# Patient Record
Sex: MALE | Race: WHITE
[De-identification: names, ages, dates, MRNs, and addresses within clinical notes are randomized per-mention and may not be internally consistent; named-entity substitution may affect disease eponyms.]

---

## 2017-01-05 ENCOUNTER — HOSPITAL ENCOUNTER (OUTPATIENT)
Dept: HOSPITAL 45 - C.LAB | Age: 72
Discharge: HOME | End: 2017-01-05
Attending: FAMILY MEDICINE
Payer: COMMERCIAL

## 2017-01-05 DIAGNOSIS — I25.10: Primary | ICD-10-CM

## 2017-01-05 DIAGNOSIS — R97.20: ICD-10-CM

## 2017-01-05 DIAGNOSIS — R73.03: ICD-10-CM

## 2017-01-05 DIAGNOSIS — E78.5: ICD-10-CM

## 2017-01-05 LAB
ALBUMIN/GLOB SERPL: 0.9 {RATIO} (ref 0.9–2)
ALP SERPL-CCNC: 99 U/L (ref 45–117)
ALT SERPL-CCNC: 26 U/L (ref 12–78)
ANION GAP SERPL CALC-SCNC: 8 MMOL/L (ref 3–11)
AST SERPL-CCNC: 23 U/L (ref 15–37)
BASOPHILS # BLD: 0.07 K/UL (ref 0–0.2)
BASOPHILS NFR BLD: 1.3 %
BUN SERPL-MCNC: 16 MG/DL (ref 7–18)
BUN/CREAT SERPL: 16.8 (ref 10–20)
CALCIUM SERPL-MCNC: 8.4 MG/DL (ref 8.5–10.1)
CHLORIDE SERPL-SCNC: 106 MMOL/L (ref 98–107)
CO2 SERPL-SCNC: 29 MMOL/L (ref 21–32)
COMPLETE: YES
CREAT SERPL-MCNC: 0.96 MG/DL (ref 0.6–1.4)
EOSINOPHIL NFR BLD AUTO: 191 K/UL (ref 130–400)
EST. AVERAGE GLUCOSE BLD GHB EST-MCNC: 128 MG/DL
GLOBULIN SER-MCNC: 3.4 GM/DL (ref 2.5–4)
GLUCOSE SERPL-MCNC: 116 MG/DL (ref 70–99)
HCT VFR BLD CALC: 43.2 % (ref 42–52)
IG%: 0.4 %
IMM GRANULOCYTES NFR BLD AUTO: 21.8 %
INR PPP: 1 (ref 0.9–1.1)
LYMPHOCYTES # BLD: 1.13 K/UL (ref 1.2–3.4)
MCH RBC QN AUTO: 30 PG (ref 25–34)
MCHC RBC AUTO-ENTMCNC: 34.3 G/DL (ref 32–36)
MCV RBC AUTO: 87.6 FL (ref 80–100)
MONOCYTES NFR BLD: 11.6 %
NEUTROPHILS # BLD AUTO: 6 %
NEUTROPHILS NFR BLD AUTO: 58.9 %
PARTIAL THROMBOPLASTIN RATIO: 1.2
PMV BLD AUTO: 9.3 FL (ref 7.4–10.4)
POTASSIUM SERPL-SCNC: 4 MMOL/L (ref 3.5–5.1)
PROTHROMBIN TIME: 10.6 SECONDS (ref 9–12)
PSA SERPL-MCNC: 6.95 NG/ML (ref 0–4)
RBC # BLD AUTO: 4.93 M/UL (ref 4.7–6.1)
SODIUM SERPL-SCNC: 143 MMOL/L (ref 136–145)
WBC # BLD AUTO: 5.19 K/UL (ref 4.8–10.8)

## 2017-01-05 NOTE — PAT MEDICATION INSTRUCTIONS
Service Date


Jan 5, 2017.





Current Home Medication List


Amlodipine (Norvasc), 5 MG PO QAM


Aspirin (Aspirin Ec), 81 MG PO QAM


Atenolol (Tenormin), 25 MG PO QAM


Doxycycline Hyclate (Doxycycline Hyclate), 1 TAB PO BID


Fluticasone Propionate (Flovent Hfa), 2 PUFFS INH BID PRN for PRN


Lorazepam (Ativan), 0.5 MG PO TID PRN for Anxiety


Omeprazole (Prilosec), 20 MG PO QAM





Medication Instructions


For Your Scheduled Surgery 





Doxycycline Hyclate (Doxycycline Hyclate), 1 TAB PO BID (to be completed prior 

to surgery)





- Take the following medications the morning of surgery with a sip of water:


Omeprazole (Prilosec), 20 MG PO QAM


Lorazepam (Ativan), 0.5 MG PO TID PRN for Anxiety


Fluticasone Propionate (Flovent Hfa), 2 PUFFS INH BID PRN for PRN


Amlodipine (Norvasc), 5 MG PO QAM


Aspirin (Aspirin Ec), 81 MG PO QAM (okay to take per surgeon instructions)


Atenolol (Tenormin), 25 MG PO QAM





- Take the following medications as scheduled the night before surgery:


Lorazepam (Ativan), 0.5 MG PO TID PRN for Anxiety


Fluticasone Propionate (Flovent Hfa), 2 PUFFS INH BID PRN for PRN





If you have any questions please call us at [phone] (Shirley Delacruz PA-C)

 or [phone] or [phone]

## 2017-01-05 NOTE — DIAGNOSTIC IMAGING REPORT
CHEST PREADMISSION(PA/LAT)



CLINICAL HISTORY: PAT preoperative evaluation



COMPARISON STUDY:  No previous studies for comparison.



FINDINGS: Prior median sternotomy. Heart is enlarged. Diaphragms smooth. Lungs

are clear. 



IMPRESSION:  Cardiomegaly post median sternotomy. No acute process. 









Electronically signed by:  Lanre Randle M.D.

1/5/2017 12:48 PM

## 2017-01-09 NOTE — CODING QUERY MEDICAL NECESSITY
SUPPORTING DIAGNOSIS NEEDED





A supporting diagnosis is required for the test/procedure performed on this patient in 
order for us to be reimbursed by the patient's insurance. Please provide a supporting 
diagnosis for the following test/procedure listed below next to the test name along with 
your signature. 



*If there is no additional diagnosis for this patient that would support the following 
test/procedure please document that below next to the test/procedure.



Test(s)/Procedure(s) that require a supporting diagnosis:

DOS 1/5

* Hba1c      DIAGNOSIS:



Provider Signature:  ______________________________  Date:  _______



Thank you  

Lesia Mohr

Health Information Management

Phone:  [phone]

Fax:  [phone]



Once completed, please kindly fax back to [phone]



For questions please call [phone]

## 2017-01-22 NOTE — HISTORY & PHYSICAL EXAMINATION
DATE OF ADMISSION:  01/27/2017

 

PREOPERATIVE HISTORY AND PHYSICAL EXAMINATION

 

SUBJECTIVE CHIEF COMPLAINT:  Left knee pain.

 

HISTORY OF PRESENT ILLNESS:  The patient is a 71-year-old male who complains

of left knee pain.  His knee pain and stiffness has been occurring for

several years that has been progressively getting worse.  He describes the

pain as aching and sharp.  He has tried cortisone and visco injections in the

past with little relief.  He has tried NSAIDs as well as physical therapy

with no relief.  He wishes to proceed with a total left knee arthroplasty.

 

PAST MEDICAL HISTORY:  Hypertension, hypercholesterolemia, osteoarthritis,

BPH, coronary artery disease.

 

PAST SURGICAL HISTORY:  Cardiac bypass surgery in 1996, two stents in 2015,

fundoplication x2, cholecystectomy, inguinal hernia surgery.

 

SOCIAL HISTORY:  He denies alcohol use.  He denies smoking or tobacco product

use.  He denies IV drug use.  He lives in a 1-story house.  He is currently

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

 

FAMILY HISTORY:  Father has a history of heart attacks.

 

MEDICATIONS:  Aspirin 81 mg 1 tab daily, atenolol 25 mg 1 tab daily, Prilosec

20 mg 1 capsule daily, Ambien 5 mg as needed for sleep, lorazepam 0.5 mg 3

times a day as needed, Welchol 625 mg, amlodipine 5 mg 1 tab daily, Flovent

110 mcg.

 

ALLERGIES:  ALL STATINS, Lisinopril, Pravachol ZETIA, VASOTEC.

 

REVIEW OF SYSTEMS:  He denies fevers, chills, headaches, weight loss, double

vision, blurry vision, sore throat, hearing loss, tremors, dizziness,

numbness or tingling, tired, thirsty, hot and cold intolerance, abdominal

pain, nausea, vomiting, diarrhea, heartburn, chest pain following into the

legs or feet, frequency going to the bathroom, pain or burning with

urination, incontinence, wheezing, cough, shortness of breath, depression,

thoughts to harm herself or harm others, nervousness or anxiousness.  He is

positive for joint pain, stiffness and swelling of the left knee.

 

OBJECTIVE PHYSICAL EXAMINATION:

GENERAL APPEARANCE:  The patient is a 71-year-old male sitting in no acute

distress, well dressed, well nourished.  He is awake, alert and oriented x3.

VITAL SIGNS:  He is 5 feet 10 inches, 258 pounds, blood pressure 128/76.

HEENT:  Extraocular movements intact, PERRLA, mucosa is moist.  No septal

deviation.

NECK:  Supple, no lymphadenopathy, no JVD, no thyromegaly.

HEART:  Irregular rate and rhythm but no murmurs or gallops.

LUNGS:  Clear to auscultation with no wheezing or rhonchi.

ABDOMEN:  Soft, nontender, nondistended.  Normal bowel sounds, no

hepatosplenomegaly.

EXTREMITIES:  Paying particular attention to his left knee, he has pain along

the medial joint line.  He has his range of motion active flexion to 0-90

degrees, active extension to 0 degrees.  He has a mildly positive valgus

stress test due to ligament laxity.

NEUROLOGIC:  Cranial nerves II-XII are intact.  Pulses were compared 
bilaterally and were symmetrical .

 

IMAGING:  X-rays of the left knee shows osteophyte formation, medial joint

space narrowing, subchondral sclerosis.

 

IMPRESSION:  Severe end-stage osteoarthritis of the left knee.

 

PLAN:  The patient is scheduled for a left knee arthroplasty.  He has failed

conservative measures such as NSAIDs, physical therapy, cortisone injections

and visco supplementation.  He wishes to proceed with the procedure.  Risks

and benefits were discussed that were included but not limited to blood

loss, nerve damage, blood vessel damage, DVT, increased pain, failure to

relieve pain, revision surgeries, and anesthesia risks.  He understands these

risks and wishes to proceed.  All questions were answered to his

satisfaction.

 

 

 

LAURENCE

## 2017-01-27 ENCOUNTER — HOSPITAL ENCOUNTER (INPATIENT)
Dept: HOSPITAL 45 - C.ACU | Age: 72
LOS: 2 days | Discharge: HOME | DRG: 470 | End: 2017-01-29
Attending: ORTHOPAEDIC SURGERY | Admitting: ORTHOPAEDIC SURGERY
Payer: COMMERCIAL

## 2017-01-27 VITALS
HEART RATE: 54 BPM | OXYGEN SATURATION: 95 % | SYSTOLIC BLOOD PRESSURE: 173 MMHG | TEMPERATURE: 98.42 F | DIASTOLIC BLOOD PRESSURE: 91 MMHG

## 2017-01-27 VITALS
HEART RATE: 73 BPM | TEMPERATURE: 97.52 F | SYSTOLIC BLOOD PRESSURE: 132 MMHG | OXYGEN SATURATION: 95 % | DIASTOLIC BLOOD PRESSURE: 78 MMHG

## 2017-01-27 VITALS
SYSTOLIC BLOOD PRESSURE: 143 MMHG | TEMPERATURE: 97.7 F | DIASTOLIC BLOOD PRESSURE: 56 MMHG | OXYGEN SATURATION: 95 % | HEART RATE: 59 BPM

## 2017-01-27 VITALS
OXYGEN SATURATION: 92 % | HEART RATE: 58 BPM | SYSTOLIC BLOOD PRESSURE: 139 MMHG | TEMPERATURE: 97.88 F | DIASTOLIC BLOOD PRESSURE: 79 MMHG

## 2017-01-27 VITALS
WEIGHT: 260.81 LBS | BODY MASS INDEX: 35.33 KG/M2 | BODY MASS INDEX: 35.33 KG/M2 | WEIGHT: 260.81 LBS | HEIGHT: 72 IN | HEIGHT: 72 IN | BODY MASS INDEX: 35.33 KG/M2

## 2017-01-27 VITALS
DIASTOLIC BLOOD PRESSURE: 70 MMHG | HEART RATE: 60 BPM | SYSTOLIC BLOOD PRESSURE: 153 MMHG | OXYGEN SATURATION: 94 % | TEMPERATURE: 97.52 F

## 2017-01-27 VITALS — SYSTOLIC BLOOD PRESSURE: 132 MMHG | HEART RATE: 57 BPM | OXYGEN SATURATION: 96 % | DIASTOLIC BLOOD PRESSURE: 73 MMHG

## 2017-01-27 VITALS
DIASTOLIC BLOOD PRESSURE: 79 MMHG | TEMPERATURE: 97.88 F | HEART RATE: 75 BPM | SYSTOLIC BLOOD PRESSURE: 144 MMHG | OXYGEN SATURATION: 94 %

## 2017-01-27 VITALS — DIASTOLIC BLOOD PRESSURE: 72 MMHG | OXYGEN SATURATION: 96 % | SYSTOLIC BLOOD PRESSURE: 129 MMHG | HEART RATE: 67 BPM

## 2017-01-27 DIAGNOSIS — I10: ICD-10-CM

## 2017-01-27 DIAGNOSIS — R00.1: ICD-10-CM

## 2017-01-27 DIAGNOSIS — D64.9: ICD-10-CM

## 2017-01-27 DIAGNOSIS — F41.9: ICD-10-CM

## 2017-01-27 DIAGNOSIS — E78.00: ICD-10-CM

## 2017-01-27 DIAGNOSIS — Z95.5: ICD-10-CM

## 2017-01-27 DIAGNOSIS — K21.9: ICD-10-CM

## 2017-01-27 DIAGNOSIS — M23.42: ICD-10-CM

## 2017-01-27 DIAGNOSIS — Z95.1: ICD-10-CM

## 2017-01-27 DIAGNOSIS — Z79.82: ICD-10-CM

## 2017-01-27 DIAGNOSIS — N40.0: ICD-10-CM

## 2017-01-27 DIAGNOSIS — Z79.51: ICD-10-CM

## 2017-01-27 DIAGNOSIS — Z79.899: ICD-10-CM

## 2017-01-27 DIAGNOSIS — M17.0: Primary | ICD-10-CM

## 2017-01-27 DIAGNOSIS — E66.9: ICD-10-CM

## 2017-01-27 DIAGNOSIS — I25.10: ICD-10-CM

## 2017-01-27 PROCEDURE — 0SRD0J9 REPLACEMENT OF LEFT KNEE JOINT WITH SYNTHETIC SUBSTITUTE, CEMENTED, OPEN APPROACH: ICD-10-PCS | Performed by: ORTHOPAEDIC SURGERY

## 2017-01-27 RX ADMIN — ACETAMINOPHEN SCH MG: 500 TABLET, COATED ORAL at 21:58

## 2017-01-27 RX ADMIN — CEFAZOLIN SCH MLS/HR: 10 INJECTION, POWDER, FOR SOLUTION INTRAVENOUS at 13:14

## 2017-01-27 RX ADMIN — OXYCODONE HYDROCHLORIDE SCH MG: 10 TABLET, FILM COATED, EXTENDED RELEASE ORAL at 21:57

## 2017-01-27 RX ADMIN — Medication SCH MG: at 21:57

## 2017-01-27 RX ADMIN — DOCUSATE SODIUM SCH MG: 100 CAPSULE, LIQUID FILLED ORAL at 21:56

## 2017-01-27 RX ADMIN — TRANEXAMIC ACID SCH MLS/HR: 100 INJECTION, SOLUTION INTRAVENOUS at 08:10

## 2017-01-27 RX ADMIN — DEXTROSE MONOHYDRATE, SODIUM CHLORIDE, AND POTASSIUM CHLORIDE SCH MLS/HR: 50; 4.5; 1.49 INJECTION, SOLUTION INTRAVENOUS at 13:18

## 2017-01-27 RX ADMIN — ACETAMINOPHEN SCH MG: 500 TABLET, COATED ORAL at 13:16

## 2017-01-27 RX ADMIN — TRANEXAMIC ACID SCH MLS/HR: 100 INJECTION, SOLUTION INTRAVENOUS at 08:00

## 2017-01-27 RX ADMIN — KETOROLAC TROMETHAMINE SCH MG: 15 INJECTION INTRAMUSCULAR; INTRAVENOUS at 18:13

## 2017-01-27 RX ADMIN — CEFAZOLIN SCH MLS/HR: 10 INJECTION, POWDER, FOR SOLUTION INTRAVENOUS at 21:57

## 2017-01-27 RX ADMIN — KETOROLAC TROMETHAMINE SCH MG: 15 INJECTION INTRAMUSCULAR; INTRAVENOUS at 13:15

## 2017-01-27 RX ADMIN — FERROUS GLUCONATE SCH MG: 324 TABLET ORAL at 18:13

## 2017-01-27 RX ADMIN — FERROUS GLUCONATE SCH MG: 324 TABLET ORAL at 13:15

## 2017-01-27 NOTE — OPERATIVE REPORT
DATE OF OPERATION:  01/27/2017

 

PREOPERATIVE DIAGNOSIS:  Left knee degenerative joint disease.

 

POSTOPERATIVE DIAGNOSIS:  Same.

 

PROCEDURE:  Left total knee arthroplasty.

 

SURGEON:  Dr. Lal. 

 

ASSISTANT:  Rito Weeks PA-C and Trevon Caban PA-C who was necessary

for assistance of procedure with positioning, prepping, draping, retraction

and closure.

 

ANESTHESIA:  Spinal with adductor canal block.

 

SPECIMEN:  Bone and tissue.

 

DRAINS:  One medium Hemovac.

 

IMPLANTS:  More \T\ Nephew Journey version 2, femur 7, tibia 6, poly 9,

patella 32 oval.

 

COMPLICATIONS:  None.

 

ESTIMATED BLOOD LOSS:  20 mL.

 

INDICATIONS:  The patient is a 71-year-old male with longstanding

degenerative joint disease of bilateral knees.  He has failed conservative

measures including cortisone injection, physical therapy and

anti-inflammatory medications.  Failing conservative measures, he wished to

proceed with left total knee arthroplasty.  Risks, benefits, and alternatives

of surgery including but not limited to infection, DVT, pain, sepsis, need

for urgent surgery, failure to relieve all symptoms, damage to blood vessels,

damage to nerves, risks of anesthesia discussed with the patient and he

wished to proceed.

 

OPERATION AND FINDINGS: 

 

DESCRIPTION OF PROCEDURE:  The patient was identified, laterality was

confirmed and marked.  He received a preoperative antibiotic as well as a

spinal and adductor canal block.  Left lower extremity had a well-padded

tourniquet applied and the limb was prepped and draped in usual sterile

manner with ChloraPrep.  Limb was exsanguinated and tourniquet was inflated. 

I made a standard anterior incision sharply incising the skin utilizing Bovie

electrocautery to achieve hemostasis.  I made a medial parapatellar

arthrotomy, mobilized the patella laterally.  I excised the anterior horns of

the medial and lateral menisci back.  I removed the fat pad and then pinned

in place a distal femoral cutting guide, which was a patient matched guide,

made my distal femoral resection and then pinned in place a size 5-in-1

cutting guide, made my anterior, posterior chamfer cuts.  I then removed the

remaining portions of the meniscus as well as the cruciates.  I then pinned

into place my tibial cutting guide, made my tibial resection and then sized

and positioned for a size 6 tibia cut for the post and pinned this into

place.  I then removed the posterior osteophytes off the femur.  He was

relatively tight, so we elevated some of the posterior capsule off the

backside of the femur. He had a fair number of loose bodies posteriorly that

were removed.  I then placed the trial femur into place, cut for the

trochlear component and then trialed with a 9 poly, had good range of motion,

good stability with a 9 poly.  I then prepared the patella with a freehand

cut and then sized and drilled for a size 32 patella.  We had good tracking

to the patella.  No lateral release was required.  All the trial components

were removed.  Wound was thoroughly irrigated.  The deep tissues were

anesthetized with an Orthomix solution and then with Simplex HV with gent

cement. We cemented the definitive components.  This was More \T\ Nephew

Journey version 2, femur size 7, tibia size 6, poly 9, patella 32 oval. 

Betadine soak was performed.  The drain was then placed.  The arthrotomy was

closed with interrupted #1 Vicryl sutures, subcutaneous tissue with

interrupted 2-0 Vicryl suture and skin with staples.  Sterile dressing was

applied and tourniquet was released.  All needle and sponge counts were

correct at the end of the procedure.  The patient was transferred to the PACU

in stable condition without apparent complication.

 

 

I attest to the content of the Intraoperative Record and any orders documented therein. Any exceptio
ns are noted below.

## 2017-01-27 NOTE — DIAGNOSTIC IMAGING REPORT
TWO VIEWS LEFT KNEE



CLINICAL HISTORY:  Postoperative examination.



FINDINGS: AP and crosstable lateral portable views of the left knee are

obtained. A left knee arthroplasty is in near anatomic alignment. There has been

undersurface remodeling of the patella. No acute fracture is seen. There are

expected postoperative changes around the knee including skin clips, a surgical

drain, soft tissue edema, and subcutaneous gas.



IMPRESSION: Expected postoperative changes status post left knee arthroplasty.

No acute fracture is seen.







Electronically signed by:  Harsh Vasques M.D.

1/27/2017 10:33 AM



Dictated Date/Time:  1/27/2017 10:33 AM

## 2017-01-27 NOTE — [facility] POST OPERATIVE BRIEF NOTE
Immediate Operative Summary


Operative Date


Jan 27, 2017.





Pre-Operative Diagnosis





Severe End-Stage Osteoarthritis of Left Knee





Post-Operative Diagnosis





Same as preoperative





Procedure(s) Performed





Left Total Knee Arthroplasty, Cemented





Surgeon


Dr. Liam Lal





Assistant Surgeon(s)


Trevon Caban PA-C, Rito Weeks PA-C





Estimated Blood Loss


20ml





Findings


above





Specimens





A.) Left Knee Bone and Tissue Fragments





Drains


2 hemovac





Anesthesia


spinal





Complication(s)


None





Disposition


Recovery Room / PACU

## 2017-01-27 NOTE — ANESTHESIOLOGY PROGRESS NOTE
Anesthesia Post Op Note


Date & Time


Jan 27, 2017 at 09:49





Vital Signs


Pain Intensity:  0





 Vital Signs Past 12 Hours








  Date Time  Temp Pulse Resp B/P Pulse Ox O2 Delivery O2 Flow Rate FiO2


 


1/27/17 09:40  52 16 114/69 98 Nasal Cannula 2 


 


1/27/17 09:30 36.2 59 16 129/68 96 Nasal Cannula 2 


 


1/27/17 06:19 36.9 54 20 173/91 95 Room Air  











Notes


Mental Status:  alert / awake / arousable, participated in evaluation


Pt Amnestic to Procedure:  Yes


Nausea / Vomiting:  adequately controlled


Pain:  adequately controlled


Airway Patency, RR, SpO2:  stable & adequate


BP & HR:  stable & adequate


Hydration State:  stable & adequate


Neuraxial Anesthesia:  was administered, sensory block is resolving


Anesthetic Complications:  no major complications apparent

## 2017-01-28 VITALS
TEMPERATURE: 97.88 F | DIASTOLIC BLOOD PRESSURE: 74 MMHG | SYSTOLIC BLOOD PRESSURE: 128 MMHG | HEART RATE: 60 BPM | OXYGEN SATURATION: 96 %

## 2017-01-28 VITALS
HEART RATE: 65 BPM | TEMPERATURE: 98.06 F | OXYGEN SATURATION: 96 % | SYSTOLIC BLOOD PRESSURE: 122 MMHG | DIASTOLIC BLOOD PRESSURE: 58 MMHG

## 2017-01-28 VITALS
DIASTOLIC BLOOD PRESSURE: 60 MMHG | TEMPERATURE: 97.52 F | SYSTOLIC BLOOD PRESSURE: 135 MMHG | OXYGEN SATURATION: 93 % | HEART RATE: 69 BPM

## 2017-01-28 VITALS
TEMPERATURE: 97.7 F | SYSTOLIC BLOOD PRESSURE: 147 MMHG | OXYGEN SATURATION: 93 % | HEART RATE: 59 BPM | DIASTOLIC BLOOD PRESSURE: 75 MMHG

## 2017-01-28 VITALS
SYSTOLIC BLOOD PRESSURE: 142 MMHG | OXYGEN SATURATION: 93 % | TEMPERATURE: 97.7 F | DIASTOLIC BLOOD PRESSURE: 80 MMHG | HEART RATE: 68 BPM

## 2017-01-28 VITALS — OXYGEN SATURATION: 96 %

## 2017-01-28 LAB
ANION GAP SERPL CALC-SCNC: 7 MMOL/L (ref 3–11)
BUN SERPL-MCNC: 19 MG/DL (ref 7–18)
BUN/CREAT SERPL: 19.1 (ref 10–20)
CALCIUM SERPL-MCNC: 8 MG/DL (ref 8.5–10.1)
CHLORIDE SERPL-SCNC: 105 MMOL/L (ref 98–107)
CO2 SERPL-SCNC: 27 MMOL/L (ref 21–32)
CREAT CL PREDICTED SERPL C-G-VRATE: 90.9 ML/MIN
CREAT SERPL-MCNC: 0.99 MG/DL (ref 0.6–1.4)
EOSINOPHIL NFR BLD AUTO: 185 K/UL (ref 130–400)
GLUCOSE SERPL-MCNC: 211 MG/DL (ref 70–99)
HCT VFR BLD CALC: 36.6 % (ref 42–52)
MCH RBC QN AUTO: 30 PG (ref 25–34)
MCHC RBC AUTO-ENTMCNC: 34.7 G/DL (ref 32–36)
MCV RBC AUTO: 86.5 FL (ref 80–100)
PMV BLD AUTO: 9.8 FL (ref 7.4–10.4)
POTASSIUM SERPL-SCNC: 3.8 MMOL/L (ref 3.5–5.1)
RBC # BLD AUTO: 4.23 M/UL (ref 4.7–6.1)
SODIUM SERPL-SCNC: 139 MMOL/L (ref 136–145)
WBC # BLD AUTO: 14.79 K/UL (ref 4.8–10.8)

## 2017-01-28 RX ADMIN — ACETAMINOPHEN SCH MG: 500 TABLET, COATED ORAL at 05:20

## 2017-01-28 RX ADMIN — FERROUS GLUCONATE SCH MG: 324 TABLET ORAL at 13:43

## 2017-01-28 RX ADMIN — ACETAMINOPHEN SCH MG: 500 TABLET, COATED ORAL at 13:43

## 2017-01-28 RX ADMIN — AMLODIPINE BESYLATE SCH MG: 5 TABLET ORAL at 09:07

## 2017-01-28 RX ADMIN — Medication SCH MG: at 09:07

## 2017-01-28 RX ADMIN — PANTOPRAZOLE SCH MG: 40 TABLET, DELAYED RELEASE ORAL at 09:09

## 2017-01-28 RX ADMIN — KETOROLAC TROMETHAMINE SCH MG: 15 INJECTION INTRAMUSCULAR; INTRAVENOUS at 05:20

## 2017-01-28 RX ADMIN — ACETAMINOPHEN SCH MG: 500 TABLET, COATED ORAL at 21:08

## 2017-01-28 RX ADMIN — FERROUS GLUCONATE SCH MG: 324 TABLET ORAL at 18:17

## 2017-01-28 RX ADMIN — DOCUSATE SODIUM SCH MG: 100 CAPSULE, LIQUID FILLED ORAL at 09:11

## 2017-01-28 RX ADMIN — CELECOXIB SCH MG: 200 CAPSULE ORAL at 21:08

## 2017-01-28 RX ADMIN — DOCUSATE SODIUM SCH MG: 100 CAPSULE, LIQUID FILLED ORAL at 21:09

## 2017-01-28 RX ADMIN — Medication SCH TAB: at 09:08

## 2017-01-28 RX ADMIN — DEXTROSE MONOHYDRATE, SODIUM CHLORIDE, AND POTASSIUM CHLORIDE SCH MLS/HR: 50; 4.5; 1.49 INJECTION, SOLUTION INTRAVENOUS at 00:03

## 2017-01-28 RX ADMIN — OXYCODONE HYDROCHLORIDE SCH MG: 10 TABLET, FILM COATED, EXTENDED RELEASE ORAL at 21:07

## 2017-01-28 RX ADMIN — DEXTROSE MONOHYDRATE, SODIUM CHLORIDE, AND POTASSIUM CHLORIDE SCH MLS/HR: 50; 4.5; 1.49 INJECTION, SOLUTION INTRAVENOUS at 13:41

## 2017-01-28 RX ADMIN — OXYCODONE HYDROCHLORIDE SCH MG: 10 TABLET, FILM COATED, EXTENDED RELEASE ORAL at 09:10

## 2017-01-28 RX ADMIN — KETOROLAC TROMETHAMINE SCH MG: 15 INJECTION INTRAMUSCULAR; INTRAVENOUS at 00:04

## 2017-01-28 RX ADMIN — FERROUS GLUCONATE SCH MG: 324 TABLET ORAL at 09:11

## 2017-01-28 RX ADMIN — Medication SCH MG: at 21:09

## 2017-01-28 NOTE — DISCHARGE INSTRUCTIONS
Discharge Instructions


Admission


Reason for Admission:  Left Knee Osteoarthrtis





Discharge


Discharge Diagnosis / Problem:  Left Total Knee Arthroplasty





Discharge Goals


Goal(s):  Decrease discomfort, Improve function, Increase independence, 

Therapeutic intervention





Activity Recommendations


Activity Limitations:  per Instructions/Follow-up section


ACTIVITY RECOMMENDATIONS:





SELF CARE INSTRUCTIONS AFTER TOTAL KNEE REPLACEMENT





A.  You may need to continue a physical therapy program after discharge from 

the hospital.  There are several options available to you. 


      Your doctor will assist you in selecting the best one for you.





   1.  An out-patient facility 2 to 3 times a week for therapy or home therapy.


   2.  Continue working on all exercises taught to you in the hospital.  Your


                 goals should be to increase bending of your knee to 90 degrees 

and


                 beyond and to fully straighten your knee.





B.  You may progress at your own pace from walking with a walker or crutches to 

a cane; then to no assistive devices.





C.   Make walking a part of your daily routine.  Be up as much as comfortable 

with rest periods throughout the day.  


      Rest with leg elevation is very important. 


      Use the ice wrap frequently for the first 3-4 weeks.





D.  There are no restrictions on activities.  You may ride in a car, shop, 

participate in household chores and all social activities.





E.  Wear the long elastic stockings (SUBHA hose) 20 hours a day for 2 weeks after 

surgery.  


     They can be removed several times a day for laundering and for a bath.





F.  You may shower, no tub baths until cleared by your doctor.








SPECIAL CARE INSTRUCTIONS:





**VERY IMPORTANT TO READ AND REVIEW**





A.  There are a few signs you need to watch for after you are home.  Call  

[facility] if you notice any of the followin.  Increased severe knee pain.  Some pain is expected especially  when you 

exercise.


   2.  Increased swelling in your leg or knee; pain or swelling of the calf 

muscle in either lower leg.


   3.  Any fluid drainage from the incision.


   4.  Shortness of breath or chest pain.





B.  Please call [facility] at [phone] if you have any  

concerns or questions about your operation or recovery.  


     The doctor or his nurse will return your call promptly.





C.  You must take antibiotics before dental work, bladder, bowel or other 

surgery.  


      Your doctor will provide you with a permanent care to carry describing 

this precaution.





IMPORTANT:





*  REMEMBER TO TAKE ASPIRIN, 81 MG, TWICE DAILY FOR 4 WEEKS UNLESS OTHERWISE 

DIRECTED.  


   THIS IS YOUR BLOOD THINNER.





*  HIGH RISK PATIENTS MAY BE PRESCRIBED A STRONGER BLOOD THINNER.  


   THIS WILL BE PROVIDED AT DISCHARGE.





*  CALL IF INCREASED PAIN, REDNESS, DRAINAGE OR FEVER GREATER THAT 101.





*  WEAR SUBHA HOSE 20 HOURS PER DAY FOR 2 WEEKS.





*  YOU MAY HAVE A LARGE BAND-AID LIKE DRESSING (SILVERON).  THIS WILL  REMAIN 

ON YOUR INCISION FOR 7 DAYS, THEN CAN BE REMOVED. 


    IF INCISION IS LEAKING THROUGH DRESSING, CALL THE OFFICE [phone].








FOLLOW UP VISIT:





If appointment is not already scheduled:





Please call [facility] to make a follow-up appointment for 


2 weeks after your surgery at [phone].








.





Current Hospital Diet


Patient's current hospital diet: Regular Diet





Discharge Diet


Recommended Diet:  Regular Diet





Procedures


Procedures Performed:  


Left Total Knee Arthroplasty, Cemented





Pending Studies


Studies pending at discharge:  no





Laboratory Results





 Hemoglobin A1c








Test


  17


12:26 Range/Units


 


 


Estimated Average Glucose 128   mg/dl


 


Hemoglobin A1c 6.1 H 4.5-5.6  %











Medical Emergencies








.


Who to Call and When:





Medical Emergencies:  If at any time you feel your situation is an emergency, 

please call 911 immediately.





.





Non-Emergent Contact


Non-Emergency issues call your:  Primary Care Provider


.





"Provider Documentation" section prepared by Skye Chowdhury.





VTE Core Measure


Inpt VTE Proph given/why not?:  Other Anticoagulation, T.E.D. Stockings, SCD's

## 2017-01-28 NOTE — ORTHOPEDIC PROGRESS NOTE
Orthopedic Progress Note


Date of Service


Jan 28, 2017.





Subjective


Post OP Day:  1


Reports: feeling well, pain controlled w PO medications, Denies: SOB, chest pain

, light headedness, nausea / vomiting





Objective


calves soft nontender, N/V intact, capillary refill less than 2 sec., dressing C

/D/I, A&O x3, toes mobile, hemovac drainage











  Date Time  Temp Pulse Resp B/P Pulse Ox O2 Delivery O2 Flow Rate FiO2


 


1/28/17 11:59 36.6 60 16 128/74 96 Room Air  


 


1/28/17 09:31     96 Room Air  


 


1/28/17 08:18 36.7 65 16 122/58 96 Room Air  


 


1/28/17 07:45      Room Air  


 


1/28/17 03:45 36.4 69 18 135/60 93 Room Air  


 


1/28/17 00:01      Room Air  


 


1/27/17 23:12 36.6 75 18 144/79 94 Room Air  


 


1/27/17 19:39 36.4 73 18 132/78 95 Room Air  


 


1/27/17 16:00      Room Air  


 


1/27/17 15:02 36.4 60 16 153/70 94 Room Air  


 


1/27/17 13:34 36.5 59 16 143/56 95 Room Air  








Laboratory Results 24 Hours:











Test


  1/28/17


05:35


 


Hematocrit 36.6 % 


 


Hemoglobin 12.7 g/dL 











Assessment & Plan


Assessment:


POD#1 s/p Left TKA








Inhouse Planning


Pain Management:  Celebrex, Oxycontin, PO Tylenol, Oxy IR


DVT Prophylaxis:  TEDs, SCDs, ASA





Discharge Planning


Discharge Planning:  home with oppt (plan for )


Therapy:  Physical Therapy, Occupational Therapy (Plan for discharge tomorrow 

with OPPT)

## 2017-01-29 VITALS
DIASTOLIC BLOOD PRESSURE: 78 MMHG | HEART RATE: 66 BPM | SYSTOLIC BLOOD PRESSURE: 144 MMHG | OXYGEN SATURATION: 96 % | TEMPERATURE: 97.88 F

## 2017-01-29 VITALS
HEART RATE: 66 BPM | DIASTOLIC BLOOD PRESSURE: 78 MMHG | TEMPERATURE: 97.88 F | OXYGEN SATURATION: 96 % | SYSTOLIC BLOOD PRESSURE: 144 MMHG

## 2017-01-29 RX ADMIN — DOCUSATE SODIUM SCH MG: 100 CAPSULE, LIQUID FILLED ORAL at 08:44

## 2017-01-29 RX ADMIN — Medication SCH MG: at 08:42

## 2017-01-29 RX ADMIN — ACETAMINOPHEN SCH MG: 500 TABLET, COATED ORAL at 05:24

## 2017-01-29 RX ADMIN — OXYCODONE HYDROCHLORIDE SCH MG: 10 TABLET, FILM COATED, EXTENDED RELEASE ORAL at 08:43

## 2017-01-29 RX ADMIN — Medication SCH TAB: at 08:42

## 2017-01-29 RX ADMIN — FERROUS GLUCONATE SCH MG: 324 TABLET ORAL at 08:41

## 2017-01-29 RX ADMIN — AMLODIPINE BESYLATE SCH MG: 5 TABLET ORAL at 08:42

## 2017-01-29 RX ADMIN — CELECOXIB SCH MG: 200 CAPSULE ORAL at 08:43

## 2017-01-29 RX ADMIN — PANTOPRAZOLE SCH MG: 40 TABLET, DELAYED RELEASE ORAL at 08:43

## 2017-01-29 NOTE — ORTHOPEDIC PROGRESS NOTE
Orthopedic Progress Note


Date of Service


Jan 29, 2017.





Subjective


Post OP Day:  2


Reports: feeling well, pain controlled w PO medications, Denies: SOB, calf pain

, chest pain, light headedness





Objective


calves soft nontender, N/V intact, capillary refill less than 2 sec., dressing C

/D/I, A&O x3, toes mobile











  Date Time  Temp Pulse Resp B/P Pulse Ox O2 Delivery O2 Flow Rate FiO2


 


1/29/17 06:31 36.6 66 20 144/78 96 Room Air  


 


1/28/17 23:00 36.5 59 16 147/75 93 Room Air  


 


1/28/17 19:15      Room Air  


 


1/28/17 15:18 36.5 68 20 142/80 93 Room Air  


 


1/28/17 11:59 36.6 60 16 128/74 96 Room Air  


 


1/28/17 09:31     96 Room Air  











Assessment & Plan


Assessment:


POD#2 s/p Left TKA








Inhouse Planning


Pain Management:  Celebrex, Oxycontin, PO Tylenol, Oxy IR


DVT Prophylaxis:  TEDs, SCDs, ASA





Discharge Planning


Discharge Planning:  home with oppt (plan for )


Therapy:  Physical Therapy, Occupational Therapy


Discharge Planning Notes:


plan for discharge today with OPPT

## 2017-01-31 NOTE — DISCHARGE SUMMARY
ADMISSION DIAGNOSIS:  Left knee osteoarthritis.  

 

DISCHARGE DIAGNOSIS:  Left total knee arthroplasty.  

 

CONSULTS:  None.

 

PROCEDURES:  On 01/27/2017 the patient had a left total knee arthroplasty.  

 

HISTORY OF PRESENT ILLNESS:  The patient is a 71-year-old male that presented

to the office with knee pain.  The knee pain was chronic and nontraumatic. 

He had failed conservative therapy which includes NSAIDs, physical therapy,

cortisone injections and viscosupplementation.  He wishes to proceed with a

left total knee arthroplasty.  

 

HOSPITAL COURSE:  Postop day 1 the patient was feeling well.  He denied

shortness of breath, chest pain, lightheadedness, nausea or vomiting.  Postop

day 2 the patient was also feeling well.  Pain was controlled with p.o.

medication.  He denied shortness of breath, calf pain, chest pain,

lightheadedness.  His plan was to be discharged today with outpatient

physical therapy with discharge condition stable.  

 

DISPOSITION:  Home self care.  

 

INSTRUCTIONS:  The patient may continue physical therapy after discharge from

the hospital.  He is to go 2-3 times a week.  He is to wear long elastic

stockings 20 hours a day for 2 weeks after surgery.  He may shower.  No

bathtub.  He is to call [facility] if he has increasing severe

knee pain, increased swelling or drainage from the incision, shortness of

breath or chest pain.  

 

MEDICATIONS:  Acetaminophen 1000 mg by mouth every 8 hour, aspirin 81 mg

twice a day, Celebrex 200 mg by mouth twice a day, Zofran 8 mg by mouth every 8

hours as needed for nausea, OxyContin 10 mg by mouth every 12 hours,

oxycodone 1-2 tablets by mouth every 4 hours as needed for pain, amlodipine 5

mg by mouth daily in the morning, atenolol 25 mg by mouth daily in the

morning, Flovent 2 puffs twice daily as needed for shortness of breath,

lorazepam 0.5 mg by mouth 3 times daily as needed for anxiety, omeprazole 20

mg by mouth daily in the morning.  

 

FOLLOWUP:  He is to schedule an appointment with Dr. Lal or his PA 12-14

days after his surgery.

 

 

 

LAURENCE

## 2018-01-15 LAB
ALBUMIN SERPL-MCNC: 3.3 GM/DL (ref 3.4–5)
BASOPHILS # BLD: 0.08 K/UL (ref 0–0.2)
BASOPHILS NFR BLD: 1.4 %
BUN SERPL-MCNC: 14 MG/DL (ref 7–18)
CALCIUM SERPL-MCNC: 8.7 MG/DL (ref 8.5–10.1)
CO2 SERPL-SCNC: 30 MMOL/L (ref 21–32)
CREAT SERPL-MCNC: 0.91 MG/DL (ref 0.6–1.4)
EOS ABS #: 0.29 K/UL (ref 0–0.5)
EOSINOPHIL NFR BLD AUTO: 193 K/UL (ref 130–400)
GLUCOSE SERPL-MCNC: 110 MG/DL (ref 70–99)
HBA1C MFR BLD: 6.2 % (ref 4.5–5.6)
HCT VFR BLD CALC: 47.5 % (ref 42–52)
HGB BLD-MCNC: 16.5 G/DL (ref 14–18)
IG#: 0.01 K/UL (ref 0–0.02)
IMM GRANULOCYTES NFR BLD AUTO: 22.8 %
INR PPP: 1 (ref 0.9–1.1)
LYMPHOCYTES # BLD: 1.3 K/UL (ref 1.2–3.4)
MCH RBC QN AUTO: 31.5 PG (ref 25–34)
MCHC RBC AUTO-ENTMCNC: 34.7 G/DL (ref 32–36)
MCV RBC AUTO: 90.8 FL (ref 80–100)
MONO ABS #: 0.52 K/UL (ref 0.11–0.59)
MONOCYTES NFR BLD: 9.1 %
NEUT ABS #: 3.5 K/UL (ref 1.4–6.5)
NEUTROPHILS # BLD AUTO: 5.1 %
NEUTROPHILS NFR BLD AUTO: 61.4 %
PMV BLD AUTO: 9.8 FL (ref 7.4–10.4)
POTASSIUM SERPL-SCNC: 3.5 MMOL/L (ref 3.5–5.1)
PTT PATIENT: 29.2 SECONDS (ref 21–31)
RED CELL DISTRIBUTION WIDTH CV: 13.4 % (ref 11.5–14.5)
RED CELL DISTRIBUTION WIDTH SD: 43.9 FL (ref 36.4–46.3)
SODIUM SERPL-SCNC: 139 MMOL/L (ref 136–145)
WBC # BLD AUTO: 5.7 K/UL (ref 4.8–10.8)

## 2018-01-15 NOTE — PAT MEDICATION INSTRUCTIONS
Service Date


Dax 15, 2018.





Current Home Medication List


Acetaminophen (Tylenol), 2 TAB PO Q8 PRN for Pain or Fever


Amlodipine (Norvasc), 5 MG PO QAM


Aspirin (Aspirin Ec), 81 MG PO QAM


Atenolol (Tenormin), 25 MG PO QAM


Colesevelam Hcl (Welchol), 2 TAB PO TID


Fluticasone Propionate (Flovent Hfa), 2 PUFFS INH BID PRN for PRN


Ibuprofen (Advil), 400 MG PO DAILY PRN for Pain


Lorazepam (Ativan), 0.5 MG PO TID PRN for Anxiety


Multivitamin (Multivitamin), 1 TAB PO QAM


Nitroglycerin (Nitrostat), 0.4 MG UT PRN PRN for CHEST PAIN


Omeprazole (Prilosec), 20 MG PO BID


Ondansetron Hcl (Zofran), 8 MG PO Q8 PRN for Nausea


Zolpidem Tartrate (Ambien), 5 MG PO HS PRN for Sleep





Medication Instructions


For Your Scheduled Surgery 





-Contact your surgeon for instructions:


Ibuprofen (Advil), 400 MG PO DAILY PRN for Pain








-Continue as directed:


Nitroglycerin (Nitrostat), 0.4 MG UT PRN PRN for CHEST PAIN








- Hold the following medications 24 hours prior to surgery:


Colesevelam Hcl (Welchol), 2 TAB PO TID








- Hold the following medications the morning of surgery:


Multivitamin (Multivitamin), 1 TAB PO QAM








- Take the following medications the morning of surgery with a sip of water:


Omeprazole (Prilosec), 20 MG PO BID


Ondansetron Hcl (Zofran), 8 MG PO Q8 PRN for Nausea (if needed)


Zolpidem Tartrate (Ambien), 5 MG PO HS PRN for Sleep (if needed)


Lorazepam (Ativan), 0.5 MG PO TID PRN for Anxiety (if needed)


Fluticasone Propionate (Flovent Hfa), 2 PUFFS INH BID PRN for PRN (if needed)


Amlodipine (Norvasc), 5 MG PO QAM


Aspirin (Aspirin Ec), 81 MG PO QAM


Atenolol (Tenormin), 25 MG PO QAM


Acetaminophen (Tylenol), 2 TAB PO Q8 PRN for Pain or Fever (if needed, can be 

taken up to four hours before surgery)








- Take the following medications as scheduled the night before surgery:


Omeprazole (Prilosec), 20 MG PO BID


Ondansetron Hcl (Zofran), 8 MG PO Q8 PRN for Nausea (if needed)


Zolpidem Tartrate (Ambien), 5 MG PO HS PRN for Sleep (if needed)


Lorazepam (Ativan), 0.5 MG PO TID PRN for Anxiety (if needed)


Fluticasone Propionate (Flovent Hfa), 2 PUFFS INH BID PRN for PRN (if needed)


Acetaminophen (Tylenol), 2 TAB PO Q8 PRN for Pain or Fever (if needed)








If you have any questions please call us at [phone] or [phone] or 

[phone]

## 2018-01-15 NOTE — DIAGNOSTIC IMAGING REPORT
CHEST 2 VIEWS ROUTINE



HISTORY:      Preop.



COMPARISON: Chest 1/5/2017.



FINDINGS: The heart remains mildly enlarged. There are poststernotomy changes.

Low lung volumes. No new focal lung consolidations. No evidence for pulmonary

edema. No pleural effusions. No pneumothorax.



IMPRESSION:

Stable cardiomegaly. Otherwise, no acute process within the chest.







Electronically signed by:  Sixto Werner M.D.

1/15/2018 2:03 PM



Dictated Date/Time:  1/15/2018 2:00 PM

## 2018-01-22 NOTE — HISTORY AND PHYSICAL
History & Physical


Date


Jan 22, 2018.





Chief Complaint


Right knee pain





History of Present Illness


The patient is a 72 year old male with complaints of right knee pain for 

several years. He has tried conservative therapy with minimal relief. He would 

like to proceed with a Right total knee arthroplasty.





Past Medical/Surgical History


Medical Problems:


(1) DJD (degenerative joint disease) of knee





Additional History


Hepatic Disease:  No


Endocrine Disorder:  No


Kidney Disease:  No


Hypertension:  No


Heart Disease:  No


Bleeding Tendencies:  No


Infectious Diseases:  No





Allergies


Coded Allergies:  


     Enalapril (Verified  Allergy, Unknown, SEVERE HEADACHE, 1/15/18)


     Statins (Verified  Allergy, Unknown, SEVERE MUSCLE ACHES, 1/15/18)


     Lisinopril (Verified  Adverse Reaction, Unknown, COUGH, 1/15/18)





Home Medications


Scheduled


Amlodipine (Norvasc), 5 MG PO QAM


Aspirin (Aspirin Ec), 81 MG PO QAM


Atenolol (Tenormin), 25 MG PO QAM


Colesevelam Hcl (Welchol), 2 TAB PO TID


Multivitamin (Multivitamin), 1 TAB PO QAM


Omeprazole (Prilosec), 20 MG PO BID





Scheduled PRN


Acetaminophen (Tylenol), 2 TAB PO Q8 PRN for Pain or Fever


Fluticasone Propionate (Flovent Hfa), 2 PUFFS INH BID PRN for PRN


Ibuprofen (Advil), 400 MG PO DAILY PRN for Pain


Lorazepam (Ativan), 0.5 MG PO TID PRN for Anxiety


Nitroglycerin (Nitrostat), 0.4 MG UT PRN PRN for CHEST PAIN


Ondansetron Hcl (Zofran), 8 MG PO Q8 PRN for Nausea


Zolpidem Tartrate (Ambien), 5 MG PO HS PRN for Sleep





Physical Examination


Skin:  warm/dry, no rash


Eyes:  normal inspection, EOMI


ENT:  normal ENT inspection


Head:  normocephalic, atraumatic


Neck:  supple, no adenopathy


Respiratory/Chest:  lungs clear, normal breath sounds


Cardiovascular:  regular rate, rhythm, no murmur


Abdomen / GI:  normal bowel sounds, non tender


Extremities:  normal inspection, + pertinent finding (Medial joint line 

tenderness. )


Neurologic/Psych:  no motor/sensory deficits, alert, oriented x 3





Diagnosis


Primary osteoarthritis of Right knee





Plan of Treatment


Patient is scheduled for a Right total knee arthroplasty. Patient has failed 

conservative therapy and would like to proceed with scheduled surgery. Risks 

and benefits were discussed with the patient and they wish to proceed. All 

questions were answered to their satisfaction.

## 2018-01-26 ENCOUNTER — HOSPITAL ENCOUNTER (INPATIENT)
Dept: HOSPITAL 45 - C.ACU | Age: 73
LOS: 2 days | Discharge: HOME HEALTH | DRG: 470 | End: 2018-01-28
Attending: ORTHOPAEDIC SURGERY | Admitting: ORTHOPAEDIC SURGERY
Payer: COMMERCIAL

## 2018-01-26 VITALS
BODY MASS INDEX: 35.09 KG/M2 | BODY MASS INDEX: 35.09 KG/M2 | WEIGHT: 264.78 LBS | BODY MASS INDEX: 35.09 KG/M2 | HEIGHT: 73 IN | WEIGHT: 264.78 LBS | HEIGHT: 73 IN

## 2018-01-26 VITALS
OXYGEN SATURATION: 95 % | DIASTOLIC BLOOD PRESSURE: 90 MMHG | SYSTOLIC BLOOD PRESSURE: 157 MMHG | TEMPERATURE: 98.6 F | HEART RATE: 60 BPM

## 2018-01-26 VITALS — HEART RATE: 62 BPM | SYSTOLIC BLOOD PRESSURE: 136 MMHG | DIASTOLIC BLOOD PRESSURE: 89 MMHG | OXYGEN SATURATION: 96 %

## 2018-01-26 VITALS
SYSTOLIC BLOOD PRESSURE: 147 MMHG | OXYGEN SATURATION: 97 % | DIASTOLIC BLOOD PRESSURE: 81 MMHG | HEART RATE: 64 BPM | TEMPERATURE: 97.88 F

## 2018-01-26 VITALS — SYSTOLIC BLOOD PRESSURE: 146 MMHG | DIASTOLIC BLOOD PRESSURE: 92 MMHG | HEART RATE: 64 BPM | OXYGEN SATURATION: 96 %

## 2018-01-26 VITALS — OXYGEN SATURATION: 96 % | HEART RATE: 63 BPM | SYSTOLIC BLOOD PRESSURE: 148 MMHG | DIASTOLIC BLOOD PRESSURE: 87 MMHG

## 2018-01-26 VITALS
OXYGEN SATURATION: 96 % | SYSTOLIC BLOOD PRESSURE: 135 MMHG | DIASTOLIC BLOOD PRESSURE: 77 MMHG | TEMPERATURE: 97.88 F | HEART RATE: 64 BPM

## 2018-01-26 VITALS
SYSTOLIC BLOOD PRESSURE: 128 MMHG | TEMPERATURE: 98.06 F | HEART RATE: 62 BPM | OXYGEN SATURATION: 96 % | DIASTOLIC BLOOD PRESSURE: 85 MMHG

## 2018-01-26 VITALS
OXYGEN SATURATION: 92 % | HEART RATE: 72 BPM | TEMPERATURE: 98.06 F | SYSTOLIC BLOOD PRESSURE: 126 MMHG | DIASTOLIC BLOOD PRESSURE: 75 MMHG

## 2018-01-26 DIAGNOSIS — M17.11: Primary | ICD-10-CM

## 2018-01-26 DIAGNOSIS — Z79.82: ICD-10-CM

## 2018-01-26 PROCEDURE — 0SRC069 REPLACEMENT OF RIGHT KNEE JOINT WITH OXIDIZED ZIRCONIUM ON POLYETHYLENE SYNTHETIC SUBSTITUTE, CEMENTED, OPEN APPROACH: ICD-10-PCS | Performed by: ORTHOPAEDIC SURGERY

## 2018-01-26 RX ADMIN — DOCUSATE SODIUM SCH MG: 100 CAPSULE, LIQUID FILLED ORAL at 21:13

## 2018-01-26 RX ADMIN — CEFAZOLIN SCH MLS/MIN: 10 INJECTION, POWDER, FOR SOLUTION INTRAVENOUS at 21:22

## 2018-01-26 RX ADMIN — TRANEXAMIC ACID SCH MLS/MIN: 100 INJECTION, SOLUTION INTRAVENOUS at 06:57

## 2018-01-26 RX ADMIN — ALUMINUM ZIRCONIUM TRICHLOROHYDREX GLY SCH EA: 0.2 STICK TOPICAL at 15:23

## 2018-01-26 RX ADMIN — PANTOPRAZOLE SCH MG: 40 TABLET, DELAYED RELEASE ORAL at 21:13

## 2018-01-26 RX ADMIN — FERROUS GLUCONATE SCH MG: 324 TABLET ORAL at 13:15

## 2018-01-26 RX ADMIN — SODIUM CHLORIDE SCH MLS/HR: 900 INJECTION, SOLUTION INTRAVENOUS at 17:24

## 2018-01-26 RX ADMIN — STANDARDIZED SENNA CONCENTRATE SCH MG: 8.6 TABLET ORAL at 21:13

## 2018-01-26 RX ADMIN — CELECOXIB SCH MG: 200 CAPSULE ORAL at 21:13

## 2018-01-26 RX ADMIN — FERROUS GLUCONATE SCH MG: 324 TABLET ORAL at 17:45

## 2018-01-26 RX ADMIN — CEFAZOLIN SCH MLS/MIN: 10 INJECTION, POWDER, FOR SOLUTION INTRAVENOUS at 15:15

## 2018-01-26 RX ADMIN — Medication SCH MG: at 21:13

## 2018-01-26 RX ADMIN — TRANEXAMIC ACID SCH MLS/MIN: 100 INJECTION, SOLUTION INTRAVENOUS at 06:30

## 2018-01-26 NOTE — [facility] OPERATIVE REPORT
Operative Report


Operative Date


Jan 26, 2018.





Pre-Operative Diagnosis





Primary Osteoarthritis Right Knee





Post-Operative Diagnosis





Primary Osteoarthritis Right Knee





Procedure(s) Performed





Right Total Knee Arthroplasty





Surgeon


Dr. Lal





Assistant Surgeon(s)


MARIBETH Ferro





Estimated Blood Loss


20 ml





Findings


as above





Specimens





A. Right Knee Bone and Tissue





Drains


2 hemovac





Anesthesia


spinal





Complication(s)


None





Disposition


Recovery Room / PACU





Indications


The patient is a 72-year-old male long-standing osteoarthritis the right knee.  

He has failed conservative measures including injections anti-inflammatories 

and rehabilitation.  He is bone-on-bone medial compartment with osteophyte 

formation in all 3 compartments.  He wishes to proceed with a right total knee 

arthroplasty.





Description of Procedure


Risks benefits and alternatives of surgery including but not limited to 

infection, DVT, pain, stiffness, need for surgery, damage to blood vessels, 

damage to nerves or risks of anesthesia were discussed with the patient and 

they wished to proceed.  The patient was identified and the laterality was 

confirmed and marked.  They received a preoperative antibiotic as well as a 

spinal anesthetic and an abductor canal block.  A well-padded tourniquet was 

applied and then the limb was prepped and draped in standard manner with 

ChloraPrep.  





The limb was exsanguinated and the tourniquet was inflated.





I made a standard anterior incision.  I sharply incised the skin then utilized 

Bovie electrocautery as well as the aqua mantis to achieve hemostasis.  I made 

a medial parapatellar arthrotomy and mobilized the patella laterally.  I then 

excised the anterior horns of the medial and lateral meniscus as well as the 

infrapatellar fat pad.  I elevated a portion of the MCL off of the tibia.  I 

then drilled for the alignment ebenezer for the varus valgus guide.  I pinned this 

into place at +0 and made my distal femoral resection.  I then sized the femur 

and he sized for a size 7.





I then pinned into place the 5 in 1 femoral cutting guide.  





I made my anterior, posterior and chamfer cuts.  I then excised the cruciates 

and the remaining portions of the menisci.  I then pinned into place a extra 

medullary tibial cutting guide and made my tibial resection.  





I then pinned into place the tibial plate a utilizing alignment ebenezer to confirm 

rotation.  





I then cut for the post.  Utilizing a lamina  and I then removed 

posterior osteophytes off the femur.  I then placed a trial femur into position 

and cut for the trochlear component.  





I then sequentially trialed to size the polyethylene until there was good soft 

tissue balancing and range of motion.  I then prepared the patella with a 

freehand cut utilizing sagittal saw.  





I sized and drilled for the patella.  





He had some slight tracking laterally to the patella.  Lateral release was 

performed.  I was able to preserve the lateral capsule.  He then had good 

tracking to the patella with a no hands technique.





All the trial components were removed.  The deep tissues were anesthetized with 

an ortho mix solution.  Then with Simplex HV with gentamicin cement, I cemented 

my definitive components.  





Definitive components, [facility] 2: 


Femur 7


Tibia 6


Poly 9


Patella 32 oval





A betadine soak was performed.





A deep drain was placed.





The arthrotomy was closed with interrupted #1 Vicryl suture subcutaneous tissue 

was closed with interrupted 2-0 Vicryl suture.





The skin was closed with with staples.  





A Silverlon was placed.





Sterile dressings were applied.  All needle and sponge counts were correct at 

the end of the procedure patient was transferred to the PACU in stable 

condition without apparent complication.





The PA-C was necessary for assistance with procedure for assistance in 

positioning, prepping, draping, retraction and closure.


I attest to the content of the Intraoperative Record and any orders documented 

therein.  Any exceptions are noted below.

## 2018-01-26 NOTE — ANESTHESIOLOGY PROGRESS NOTE
Anesthesia Post Op Note


Date & Time


Jan 26, 2018 at 10:43





Vital Signs


Pain Intensity:  0





Vital Signs Past 12 Hours








  Date Time  Temp Pulse Resp B/P (MAP) Pulse Ox O2 Delivery O2 Flow Rate FiO2


 


1/26/18 10:38  67 18     


 


1/26/18 10:38  68 18  96   


 


1/26/18 10:36    121/86    


 


1/26/18 10:33  66 23     


 


1/26/18 10:33  66 23  96   


 


1/26/18 10:32  64 29     


 


1/26/18 10:32  65 29  95   


 


1/26/18 10:31    126/78    


 


1/26/18 10:27  66 16     


 


1/26/18 10:27  67 16  97   


 


1/26/18 10:26    131/84    


 


1/26/18 10:25  66 21  97   


 


1/26/18 10:25  66 21     


 


1/26/18 10:21    137/84    


 


1/26/18 10:20  66 29  93   


 


1/26/18 10:20  66 29     


 


1/26/18 10:16    121/84    


 


1/26/18 10:15  64 20     


 


1/26/18 10:15  63 20  95   


 


1/26/18 10:11    136/82    


 


1/26/18 10:10  65 14  96   


 


1/26/18 10:10  65 14     


 


1/26/18 10:09  65 27     


 


1/26/18 10:09  67 27  95   


 


1/26/18 10:06    123/81    


 


1/26/18 10:04  64 26  93   


 


1/26/18 10:04  65 26     


 


1/26/18 10:01    138/86    


 


1/26/18 09:59  63 15  95   


 


1/26/18 09:59  63 15     


 


1/26/18 09:56    156/85    


 


1/26/18 09:54  65 21  96   


 


1/26/18 09:54  64 21     


 


1/26/18 09:51    157/91    


 


1/26/18 09:49  62 22  93   


 


1/26/18 09:49  62 22     


 


1/26/18 09:48 36.6 63 15 131/85 (102) 95 Nasal Cannula 2 


 


1/26/18 09:46    131/85    


 


1/26/18 09:44  64 26  96   


 


1/26/18 09:44  64 26     


 


1/26/18 09:41    158/84    


 


1/26/18 09:39  61 16     


 


1/26/18 09:39  60 16  97   


 


1/26/18 09:36    134/85    


 


1/26/18 09:34  63 21  98   


 


1/26/18 09:34  63 21     


 


1/26/18 09:33  60 15  97   


 


1/26/18 09:33  62 15     


 


1/26/18 09:31    155/94    


 


1/26/18 09:28  65 15     


 


1/26/18 09:28  65 15  98   


 


1/26/18 09:26    160/87    


 


1/26/18 09:23  62 14     


 


1/26/18 09:23  63 14  99   


 


1/26/18 09:21    165/105    


 


1/26/18 09:19    162/79    


 


1/26/18 09:18 36.3 62 20 162/79 (98) 99 Oxymask 10 


 


1/26/18 05:48 37.0 60 16 157/90 95 Room Air  











Notes


Mental Status:  alert / awake / arousable, participated in evaluation


Pt Amnestic to Procedure:  Yes


Nausea / Vomiting:  adequately controlled


Pain:  adequately controlled


Airway Patency, RR, SpO2:  stable & adequate


BP & HR:  stable & adequate


Hydration State:  stable & adequate


Anesthetic Complications:  no major complications apparent

## 2018-01-26 NOTE — DIAGNOSTIC IMAGING REPORT
TWO VIEWS RIGHT KNEE



CLINICAL HISTORY:  Postoperative examination.



FINDINGS: AP and crosstable lateral portable views of the right knee are

obtained. A right knee arthroplasty is in near anatomic alignment. There has

been undersurface remodeling of the patella. No acute fracture is seen. There

are expected postoperative changes around the knee including skin clips, a

surgical drain, soft tissue edema, and subcutaneous gas.



IMPRESSION: Expected postoperative changes status post right knee arthroplasty.

No acute fracture is seen.







Electronically signed by:  Harsh Vasques M.D.

1/26/2018 10:06 AM



Dictated Date/Time:  1/26/2018 10:06 AM

## 2018-01-27 VITALS
HEART RATE: 64 BPM | DIASTOLIC BLOOD PRESSURE: 83 MMHG | SYSTOLIC BLOOD PRESSURE: 148 MMHG | TEMPERATURE: 98.42 F | OXYGEN SATURATION: 94 %

## 2018-01-27 VITALS
TEMPERATURE: 98.42 F | DIASTOLIC BLOOD PRESSURE: 85 MMHG | OXYGEN SATURATION: 94 % | HEART RATE: 67 BPM | SYSTOLIC BLOOD PRESSURE: 165 MMHG

## 2018-01-27 VITALS — OXYGEN SATURATION: 94 %

## 2018-01-27 VITALS
SYSTOLIC BLOOD PRESSURE: 156 MMHG | OXYGEN SATURATION: 94 % | TEMPERATURE: 98.24 F | HEART RATE: 64 BPM | DIASTOLIC BLOOD PRESSURE: 78 MMHG

## 2018-01-27 VITALS
OXYGEN SATURATION: 95 % | TEMPERATURE: 98.42 F | HEART RATE: 62 BPM | DIASTOLIC BLOOD PRESSURE: 82 MMHG | SYSTOLIC BLOOD PRESSURE: 168 MMHG

## 2018-01-27 VITALS
OXYGEN SATURATION: 95 % | HEART RATE: 62 BPM | DIASTOLIC BLOOD PRESSURE: 70 MMHG | TEMPERATURE: 98.24 F | SYSTOLIC BLOOD PRESSURE: 149 MMHG

## 2018-01-27 LAB
BUN SERPL-MCNC: 19 MG/DL (ref 7–18)
CALCIUM SERPL-MCNC: 8.4 MG/DL (ref 8.5–10.1)
CO2 SERPL-SCNC: 25 MMOL/L (ref 21–32)
CREAT SERPL-MCNC: 0.88 MG/DL (ref 0.6–1.4)
EOSINOPHIL NFR BLD AUTO: 154 K/UL (ref 130–400)
GLUCOSE SERPL-MCNC: 200 MG/DL (ref 70–99)
HCT VFR BLD CALC: 38.7 % (ref 42–52)
HGB BLD-MCNC: 13.5 G/DL (ref 14–18)
INR PPP: 1 (ref 0.9–1.1)
MCH RBC QN AUTO: 31 PG (ref 25–34)
MCHC RBC AUTO-ENTMCNC: 34.9 G/DL (ref 32–36)
MCV RBC AUTO: 89 FL (ref 80–100)
PMV BLD AUTO: 9.7 FL (ref 7.4–10.4)
POTASSIUM SERPL-SCNC: 3.5 MMOL/L (ref 3.5–5.1)
RED CELL DISTRIBUTION WIDTH CV: 13.2 % (ref 11.5–14.5)
RED CELL DISTRIBUTION WIDTH SD: 42.9 FL (ref 36.4–46.3)
SODIUM SERPL-SCNC: 137 MMOL/L (ref 136–145)
WBC # BLD AUTO: 11.55 K/UL (ref 4.8–10.8)

## 2018-01-27 RX ADMIN — DOCUSATE SODIUM SCH MG: 100 CAPSULE, LIQUID FILLED ORAL at 07:25

## 2018-01-27 RX ADMIN — FERROUS GLUCONATE SCH MG: 324 TABLET ORAL at 12:32

## 2018-01-27 RX ADMIN — ACETAMINOPHEN SCH MG: 500 TABLET, COATED ORAL at 22:17

## 2018-01-27 RX ADMIN — ALUMINUM ZIRCONIUM TRICHLOROHYDREX GLY SCH EA: 0.2 STICK TOPICAL at 00:00

## 2018-01-27 RX ADMIN — CELECOXIB SCH MG: 200 CAPSULE ORAL at 20:34

## 2018-01-27 RX ADMIN — Medication SCH TAB: at 07:25

## 2018-01-27 RX ADMIN — ALUMINUM ZIRCONIUM TRICHLOROHYDREX GLY SCH EA: 0.2 STICK TOPICAL at 15:15

## 2018-01-27 RX ADMIN — SODIUM CHLORIDE SCH MLS/HR: 900 INJECTION, SOLUTION INTRAVENOUS at 05:19

## 2018-01-27 RX ADMIN — STANDARDIZED SENNA CONCENTRATE SCH MG: 8.6 TABLET ORAL at 20:33

## 2018-01-27 RX ADMIN — FERROUS GLUCONATE SCH MG: 324 TABLET ORAL at 07:25

## 2018-01-27 RX ADMIN — CELECOXIB SCH MG: 200 CAPSULE ORAL at 07:26

## 2018-01-27 RX ADMIN — DOCUSATE SODIUM SCH MG: 100 CAPSULE, LIQUID FILLED ORAL at 20:32

## 2018-01-27 RX ADMIN — FERROUS GLUCONATE SCH MG: 324 TABLET ORAL at 15:43

## 2018-01-27 RX ADMIN — Medication SCH MG: at 20:32

## 2018-01-27 RX ADMIN — ALUMINUM ZIRCONIUM TRICHLOROHYDREX GLY SCH EA: 0.2 STICK TOPICAL at 07:24

## 2018-01-27 RX ADMIN — Medication SCH MG: at 07:25

## 2018-01-27 RX ADMIN — PANTOPRAZOLE SCH MG: 40 TABLET, DELAYED RELEASE ORAL at 07:26

## 2018-01-27 RX ADMIN — PANTOPRAZOLE SCH MG: 40 TABLET, DELAYED RELEASE ORAL at 20:34

## 2018-01-27 RX ADMIN — AMLODIPINE BESYLATE SCH MG: 5 TABLET ORAL at 07:27

## 2018-01-27 RX ADMIN — ACETAMINOPHEN SCH MG: 500 TABLET, COATED ORAL at 13:29

## 2018-01-27 RX ADMIN — SODIUM CHLORIDE SCH MLS/HR: 900 INJECTION, SOLUTION INTRAVENOUS at 03:27

## 2018-01-27 NOTE — DISCHARGE INSTRUCTIONS
Discharge Instructions


Date of Service


2018.





Admission


Reason for Admission:  Right Knee Osteoarthritis





Discharge


Discharge Diagnosis / Problem:  right TKA





Discharge Goals


Goal(s):  Decrease discomfort, Improve function, Increase independence, 

Therapeutic intervention





Activity Recommendations


Activity Limitations:  per Instructions/Follow-up section





.





Instructions / Follow-Up


Instructions / Follow-Up


ACTIVITY RECOMMENDATIONS:





SELF CARE INSTRUCTIONS AFTER TOTAL KNEE REPLACEMENT





A.  You may need to continue a physical therapy program after discharge from 

the hospital.  There are several options available to you. 


      Your doctor will assist you in selecting the best one for you.





   1.  An out-patient facility 2 to 3 times a week for therapy or home therapy.


   2.  Continue working on all exercises taught to you in the hospital.  Your


                 goals should be to increase bending of your knee to 90 degrees 

and


                 beyond and to fully straighten your knee.





B.  You may progress at your own pace from walking with a walker or crutches to 

a cane; then to no assistive devices.





C.   Make walking a part of your daily routine.  Be up as much as comfortable 

with rest periods throughout the day.  


      Rest with leg elevation is very important. 


      Use the ice wrap frequently for the first 3-4 weeks.





D.  There are no restrictions on activities.  You may ride in a car, shop, 

participate in household chores and all social activities.





E.  Wear the long elastic stockings (SUBHA hose) 20 hours a day for 2 weeks after 

surgery.  


     They can be removed several times a day for laundering and for a bath.





F.  You may shower, no tub baths until cleared by your doctor.








SPECIAL CARE INSTRUCTIONS:





**VERY IMPORTANT TO READ AND REVIEW**





A.  There are a few signs you need to watch for after you are home.  Call  

[facility] if you notice any of the followin.  Increased severe knee pain.  Some pain is expected especially  when you 

exercise.


   2.  Increased swelling in your leg or knee; pain or swelling of the calf 

muscle in either lower leg.


   3.  Any fluid drainage from the incision.


   4.  Shortness of breath or chest pain.





B.  Please call [facility] at [phone] if you have any  

concerns or questions about your operation or recovery.  


     The doctor or his nurse will return your call promptly.





C.  You must take antibiotics before dental work, bladder, bowel or other 

surgery.  


      Your doctor will provide you with a permanent care to carry describing 

this precaution.





IMPORTANT:





*  REMEMBER TO TAKE ASPIRIN, 81 MG, TWICE DAILY FOR 4 WEEKS UNLESS OTHERWISE 

DIRECTED.  


   THIS IS YOUR BLOOD THINNER.





*  HIGH RISK PATIENTS MAY BE PRESCRIBED A STRONGER BLOOD THINNER.  


   THIS WILL BE PROVIDED AT DISCHARGE.





*  CALL IF INCREASED PAIN, REDNESS, DRAINAGE OR FEVER GREATER THAT 101.





*  WEAR SUBHA HOSE 20 HOURS PER DAY FOR 2 WEEKS.





*  YOU MAY HAVE A LARGE BAND-AID LIKE DRESSING (SILVERON).  THIS WILL  REMAIN 

ON YOUR INCISION FOR 7 DAYS, THEN CAN BE REMOVED. 


    IF INCISION IS LEAKING THROUGH DRESSING, CALL THE OFFICE [phone].








FOLLOW UP VISIT:





If appointment is not already scheduled:





Please call [facility] to make a follow-up appointment for 


2 weeks after your surgery at [phone].





Current Hospital Diet


Patient's current hospital diet: Regular Diet





Discharge Diet


Recommended Diet:  Regular Diet





Procedures


Procedures Performed:  


Right Total Knee Arthroplasty





Pending Studies


Studies pending at discharge:  no





Laboratory Results





Hemoglobin A1c








Test


  1/15/18


13:56 Range/Units


 


 


Estimated Average Glucose 131   mg/dl


 


Hemoglobin A1c 6.2 H 4.5-5.6  %











Medical Emergencies








.


Who to Call and When:





Medical Emergencies:  If at any time you feel your situation is an emergency, 

please call 911 immediately.





.





Non-Emergent Contact


Non-Emergency issues call your:  Primary Care Provider


.








"Provider Documentation" section prepared by Skye Chowdhury.








.





VTE Core Measure


Inpt VTE Proph given/why not?:  Other Anticoagulation, T.E.D. Stockings, SCD's





[place] Drug [place]


Search Results:  patient reviewed within database, no issues identified

## 2018-01-27 NOTE — ORTHOPEDIC PROGRESS NOTE
Orthopedic Progress Note


Date of Service


Jan 27, 2018.





Subjective


Post OP Day:  1


Reports: feeling well, pain controlled w PO medications, Denies: chest pain, SOB

, nausea / vomiting, light headedness, calf pain





Objective


calves soft nontender, N/V intact, capillary refill less than 2 sec., dressing C

/D/I, A&O x3, toes mobile, hemovac drainage











  Date Time  Temp Pulse Resp B/P (MAP) Pulse Ox O2 Delivery O2 Flow Rate FiO2


 


1/27/18 12:27 36.9 64 17 148/83 (104) 94 Room Air  


 


1/27/18 08:00     94 Room Air  


 


1/27/18 07:00 36.9 67 17 165/85 (111) 94 Room Air  


 


1/27/18 03:49 36.8 64 16 156/78 (104) 94 Room Air  


 


1/26/18 23:20      Room Air  


 


1/26/18 22:45 36.7 72 16 126/75 (92) 92 Room Air  


 


1/26/18 15:39 36.7 62 18 128/85 (99) 96 Room Air  


 


1/26/18 13:45  62 16 136/89 (105) 96 Room Air  


 


1/26/18 12:43  64 17 146/92 (110) 96 Nasal Cannula 2.0 








Laboratory Results 24 Hours:











Test


  1/27/18


07:42


 


Hematocrit 38.7 % 


 


Hemoglobin 13.5 g/dL 


 


Prothromb Time International


Ratio 1.0 


 


 


Prothrombin Time 10.7 SECONDS 











Assessment & Plan


Assessment:


POD #1 right TKA


Plan:


Pt doing well.


plan for discharge to home with HH tomorrow if continuing to do well








Inhouse Planning


Pain Management:  PO Tylenol, Oxy IR


DVT Prophylaxis:  TEDs, SCDs, ASA





Discharge Planning


Discharge Planning:  home with home health


Pain Management:  PO Tylenol, Oxy IR


DVT Prophylaxis:  TEDs, ASA


Therapy:  Physical Therapy

## 2018-01-28 VITALS
DIASTOLIC BLOOD PRESSURE: 87 MMHG | HEART RATE: 61 BPM | OXYGEN SATURATION: 93 % | TEMPERATURE: 98.78 F | SYSTOLIC BLOOD PRESSURE: 158 MMHG

## 2018-01-28 VITALS
HEART RATE: 61 BPM | DIASTOLIC BLOOD PRESSURE: 87 MMHG | OXYGEN SATURATION: 93 % | SYSTOLIC BLOOD PRESSURE: 158 MMHG | TEMPERATURE: 98.78 F

## 2018-01-28 RX ADMIN — FERROUS GLUCONATE SCH MG: 324 TABLET ORAL at 09:04

## 2018-01-28 RX ADMIN — ALUMINUM ZIRCONIUM TRICHLOROHYDREX GLY SCH EA: 0.2 STICK TOPICAL at 07:46

## 2018-01-28 RX ADMIN — DOCUSATE SODIUM SCH MG: 100 CAPSULE, LIQUID FILLED ORAL at 07:07

## 2018-01-28 RX ADMIN — Medication SCH TAB: at 09:04

## 2018-01-28 RX ADMIN — ALUMINUM ZIRCONIUM TRICHLOROHYDREX GLY SCH EA: 0.2 STICK TOPICAL at 00:00

## 2018-01-28 RX ADMIN — AMLODIPINE BESYLATE SCH MG: 5 TABLET ORAL at 09:06

## 2018-01-28 RX ADMIN — PANTOPRAZOLE SCH MG: 40 TABLET, DELAYED RELEASE ORAL at 09:05

## 2018-01-28 RX ADMIN — Medication SCH MG: at 09:05

## 2018-01-28 RX ADMIN — CELECOXIB SCH MG: 200 CAPSULE ORAL at 09:05

## 2018-01-28 RX ADMIN — ACETAMINOPHEN SCH MG: 500 TABLET, COATED ORAL at 05:19

## 2018-01-28 NOTE — ORTHOPEDIC PROGRESS NOTE
Orthopedic Progress Note


Date of Service


Jan 28, 2018.





Subjective


Post OP Day:  2


Reports: feeling well, pain controlled w PO medications, Denies: chest pain, SOB

, nausea / vomiting, light headedness, calf pain





Objective


calves soft nontender, N/V intact, capillary refill less than 2 sec., dressing C

/D/I, A&O x3, toes mobile











  Date Time  Temp Pulse Resp B/P (MAP) Pulse Ox O2 Delivery O2 Flow Rate FiO2


 


1/28/18 07:00 37.1 61 17 158/87 (110) 93 Room Air  


 


1/28/18 00:20      Room Air  


 


1/27/18 22:44 36.8 62 16 149/70 (96) 95 Room Air  


 


1/27/18 19:40      Room Air  


 


1/27/18 15:01 36.9 62 16 168/82 (110) 95 Room Air  


 


1/27/18 12:27 36.9 64 17 148/83 (104) 94 Room Air  











Assessment & Plan


Assessment:


POD #2 right TKA


Plan:


plan for discharge home today with HH








Inhouse Planning


Pain Management:  Celebrex, PO Tylenol, Oxy IR


DVT Prophylaxis:  TEDs, SCDs, ASA





Discharge Planning


Discharge Planning:  home with home health


Pain Management:  Celebrex, PO Tylenol, Oxy IR


DVT Prophylaxis:  TEDs, ASA


Therapy:  Physical Therapy

## 2018-01-30 NOTE — DISCHARGE SUMMARY
Orthopedic Discharge Summary


Admission Date/Reason


Jan 26, 2018 at 06:50


Right Knee Osteoarthritis.





Discharge Date/Disposition


Jan 28, 2018


Home with services





Diagnosis


Principal Diagnosis:


S/P right total knee arthroplasty





Medication Reconciliation


as per discharge instructions





Admission Physical Exam


As per Admitting History & Physical.





Hospital Course


POD#1 patient was feeling well. Pain was well controlled with PO medications. 

He would like to go home with home health and this was to be arranged for 

Sunday. His dressing was clean dry and intact. POD#2 patient was feeling well. 

Dressing was clean, dry and intact. He was to be discharged later after morning 

PT home with home health.





Discharge Instructions


Please refer to the electronic Patient Visit Report (Discharge Instructions) 

for additional information.

## 2018-08-03 ENCOUNTER — HOSPITAL ENCOUNTER (INPATIENT)
Dept: HOSPITAL 45 - C.2T | Age: 73
LOS: 4 days | Discharge: HOME HEALTH | DRG: 486 | End: 2018-08-07
Attending: ORTHOPAEDIC SURGERY | Admitting: ORTHOPAEDIC SURGERY
Payer: COMMERCIAL

## 2018-08-03 VITALS
TEMPERATURE: 100.22 F | OXYGEN SATURATION: 95 % | HEART RATE: 81 BPM | SYSTOLIC BLOOD PRESSURE: 139 MMHG | DIASTOLIC BLOOD PRESSURE: 79 MMHG

## 2018-08-03 VITALS
DIASTOLIC BLOOD PRESSURE: 78 MMHG | TEMPERATURE: 99.5 F | SYSTOLIC BLOOD PRESSURE: 172 MMHG | HEART RATE: 80 BPM | OXYGEN SATURATION: 97 %

## 2018-08-03 VITALS
WEIGHT: 264.55 LBS | BODY MASS INDEX: 35.06 KG/M2 | WEIGHT: 264.55 LBS | HEIGHT: 73 IN | BODY MASS INDEX: 35.06 KG/M2 | HEIGHT: 73 IN

## 2018-08-03 DIAGNOSIS — I25.10: ICD-10-CM

## 2018-08-03 DIAGNOSIS — D62: ICD-10-CM
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
## 2018-08-03 DIAGNOSIS — N40.0: ICD-10-CM
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
## 2018-08-03 DIAGNOSIS — Z79.899: ICD-10-CM

## 2018-08-03 DIAGNOSIS — T84.53XA: Primary | ICD-10-CM

## 2018-08-03 DIAGNOSIS — I10: ICD-10-CM

## 2018-08-03 DIAGNOSIS — Z96.653: ICD-10-CM

## 2018-08-03 DIAGNOSIS — Z79.82: ICD-10-CM

## 2018-08-03 DIAGNOSIS — E78.00: ICD-10-CM

## 2018-08-03 DIAGNOSIS — R06.2: ICD-10-CM

## 2018-08-03 LAB
ALBUMIN SERPL-MCNC: 2.6 GM/DL (ref 3.4–5)
ALP SERPL-CCNC: 83 U/L (ref 45–117)
ALT SERPL-CCNC: 24 U/L (ref 12–78)
AST SERPL-CCNC: 23 U/L (ref 15–37)
BASOPHILS # BLD: 0.01 K/UL (ref 0–0.2)
BASOPHILS NFR BLD: 0.1 %
BUN SERPL-MCNC: 19 MG/DL (ref 7–18)
CALCIUM SERPL-MCNC: 7.9 MG/DL (ref 8.5–10.1)
CO2 SERPL-SCNC: 26 MMOL/L (ref 21–32)
CREAT SERPL-MCNC: 1.06 MG/DL (ref 0.6–1.4)
EOS ABS #: 0 K/UL (ref 0–0.5)
GLUCOSE SERPL-MCNC: 152 MG/DL (ref 70–99)
HCT VFR BLD CALC: 43.7 % (ref 42–52)
HGB BLD-MCNC: 15 G/DL (ref 14–18)
IG#: 0.04 K/UL (ref 0–0.02)
IMM GRANULOCYTES NFR BLD AUTO: 3.9 %
LYMPHOCYTES # BLD: 0.42 K/UL (ref 1.2–3.4)
MCH RBC QN AUTO: 30.4 PG (ref 25–34)
MCHC RBC AUTO-ENTMCNC: 34.3 G/DL (ref 32–36)
MCV RBC AUTO: 88.6 FL (ref 80–100)
MONO ABS #: 1.39 K/UL (ref 0.11–0.59)
MONOCYTES NFR BLD: 12.9 %
NEUT ABS #: 8.95 K/UL (ref 1.4–6.5)
NEUTROPHILS # BLD AUTO: 0 %
NEUTROPHILS NFR BLD AUTO: 82.7 %
PMV BLD AUTO: 10.5 FL (ref 7.4–10.4)
POTASSIUM SERPL-SCNC: 3.5 MMOL/L (ref 3.5–5.1)
PROT SERPL-MCNC: 6.4 GM/DL (ref 6.4–8.2)
RED CELL DISTRIBUTION WIDTH CV: 14.4 % (ref 11.5–14.5)
RED CELL DISTRIBUTION WIDTH SD: 46.7 FL (ref 36.4–46.3)
SODIUM SERPL-SCNC: 134 MMOL/L (ref 136–145)
WBC # BLD AUTO: 10.81 K/UL (ref 4.8–10.8)

## 2018-08-03 NOTE — DIAGNOSTIC IMAGING REPORT
CHEST ONE VIEW PORTABLE



CLINICAL HISTORY: ffup study, poss congestion from outpx cxr    



COMPARISON STUDY:  No previous studies for comparison.



FINDINGS: Moderate cardiac megaly. Prior median sternotomy. Lungs are clear.

Mild interstitial prominence left lung base with suboptimal visibility left

hemidiaphragm. 



IMPRESSION:  Mild parenchymal infiltrate left base. Moderate cardiac megaly. 











The above report was generated using voice recognition software.  It may contain

grammatical, syntax or spelling errors.









Electronically signed by:  Lanre Randle M.D.

8/3/2018 8:58 PM



Dictated Date/Time:  8/3/2018 8:57 PM

## 2018-08-04 VITALS
HEART RATE: 73 BPM | SYSTOLIC BLOOD PRESSURE: 144 MMHG | DIASTOLIC BLOOD PRESSURE: 88 MMHG | OXYGEN SATURATION: 97 % | TEMPERATURE: 98.24 F

## 2018-08-04 VITALS
SYSTOLIC BLOOD PRESSURE: 160 MMHG | TEMPERATURE: 98.6 F | DIASTOLIC BLOOD PRESSURE: 83 MMHG | HEART RATE: 75 BPM | OXYGEN SATURATION: 97 %

## 2018-08-04 VITALS
DIASTOLIC BLOOD PRESSURE: 68 MMHG | SYSTOLIC BLOOD PRESSURE: 120 MMHG | TEMPERATURE: 98.42 F | OXYGEN SATURATION: 95 % | HEART RATE: 66 BPM

## 2018-08-04 VITALS
OXYGEN SATURATION: 86 % | SYSTOLIC BLOOD PRESSURE: 112 MMHG | HEART RATE: 65 BPM | TEMPERATURE: 97.7 F | DIASTOLIC BLOOD PRESSURE: 67 MMHG

## 2018-08-04 VITALS
OXYGEN SATURATION: 95 % | SYSTOLIC BLOOD PRESSURE: 108 MMHG | DIASTOLIC BLOOD PRESSURE: 66 MMHG | HEART RATE: 76 BPM | TEMPERATURE: 98.96 F

## 2018-08-04 VITALS — OXYGEN SATURATION: 95 %

## 2018-08-04 VITALS
HEART RATE: 76 BPM | SYSTOLIC BLOOD PRESSURE: 157 MMHG | OXYGEN SATURATION: 96 % | TEMPERATURE: 99.32 F | DIASTOLIC BLOOD PRESSURE: 82 MMHG

## 2018-08-04 VITALS — OXYGEN SATURATION: 96 %

## 2018-08-04 LAB
ALBUMIN SERPL-MCNC: 2.2 GM/DL (ref 3.4–5)
ALP SERPL-CCNC: 101 U/L (ref 45–117)
ALT SERPL-CCNC: 24 U/L (ref 12–78)
AST SERPL-CCNC: 28 U/L (ref 15–37)
BASOPHILS # BLD: 0.01 K/UL (ref 0–0.2)
BASOPHILS NFR BLD: 0.1 %
BUN SERPL-MCNC: 22 MG/DL (ref 7–18)
CALCIUM SERPL-MCNC: 7.8 MG/DL (ref 8.5–10.1)
CO2 SERPL-SCNC: 28 MMOL/L (ref 21–32)
CREAT SERPL-MCNC: 1.01 MG/DL (ref 0.6–1.4)
EOS ABS #: 0.01 K/UL (ref 0–0.5)
EOSINOPHIL NFR BLD AUTO: 116 K/UL (ref 130–400)
GLUCOSE SERPL-MCNC: 157 MG/DL (ref 70–99)
HBA1C MFR BLD: 6.3 % (ref 4.5–5.6)
HCT VFR BLD CALC: 41.6 % (ref 42–52)
HGB BLD-MCNC: 13.8 G/DL (ref 14–18)
IG#: 0.03 K/UL (ref 0–0.02)
IMM GRANULOCYTES NFR BLD AUTO: 5.4 %
LYMPHOCYTES # BLD: 0.54 K/UL (ref 1.2–3.4)
MCH RBC QN AUTO: 30.2 PG (ref 25–34)
MCHC RBC AUTO-ENTMCNC: 33.2 G/DL (ref 32–36)
MCV RBC AUTO: 91 FL (ref 80–100)
MONO ABS #: 1.08 K/UL (ref 0.11–0.59)
MONOCYTES NFR BLD: 10.8 %
NEUT ABS #: 8.34 K/UL (ref 1.4–6.5)
NEUTROPHILS # BLD AUTO: 0.1 %
NEUTROPHILS NFR BLD AUTO: 83.3 %
PMV BLD AUTO: 9.3 FL (ref 7.4–10.4)
POTASSIUM SERPL-SCNC: 3.7 MMOL/L (ref 3.5–5.1)
PROT SERPL-MCNC: 6.1 GM/DL (ref 6.4–8.2)
RED CELL DISTRIBUTION WIDTH CV: 14.5 % (ref 11.5–14.5)
RED CELL DISTRIBUTION WIDTH SD: 48.5 FL (ref 36.4–46.3)
SODIUM SERPL-SCNC: 136 MMOL/L (ref 136–145)
WBC # BLD AUTO: 10.01 K/UL (ref 4.8–10.8)

## 2018-08-04 PROCEDURE — 0SPC09Z REMOVAL OF LINER FROM RIGHT KNEE JOINT, OPEN APPROACH: ICD-10-PCS | Performed by: ORTHOPAEDIC SURGERY

## 2018-08-04 PROCEDURE — 0SUV09Z SUPPLEMENT RIGHT KNEE JOINT, TIBIAL SURFACE WITH LINER, OPEN APPROACH: ICD-10-PCS | Performed by: ORTHOPAEDIC SURGERY

## 2018-08-04 RX ADMIN — AMLODIPINE BESYLATE SCH MG: 5 TABLET ORAL at 10:32

## 2018-08-04 RX ADMIN — ACETAMINOPHEN SCH MG: 500 TABLET, COATED ORAL at 23:30

## 2018-08-04 RX ADMIN — PANTOPRAZOLE SCH MG: 40 TABLET, DELAYED RELEASE ORAL at 21:16

## 2018-08-04 RX ADMIN — Medication SCH MG: at 21:15

## 2018-08-04 RX ADMIN — ACETAMINOPHEN SCH MG: 500 TABLET, COATED ORAL at 16:26

## 2018-08-04 RX ADMIN — ACETAMINOPHEN SCH MG: 500 TABLET, COATED ORAL at 10:31

## 2018-08-04 RX ADMIN — Medication SCH TAB: at 10:31

## 2018-08-04 RX ADMIN — VANCOMYCIN HYDROCHLORIDE SCH MLS/HR: 1 INJECTION, POWDER, LYOPHILIZED, FOR SOLUTION INTRAVENOUS at 14:50

## 2018-08-04 NOTE — [facility] OPERATIVE REPORT
Operative Report


Operative Date


Aug 4, 2018.





Pre-Operative Diagnosis





Infected Right Total Knee





Post-Operative Diagnosis





Infected Right Total Knee





Procedure(s) Performed





Right Knee Incision and Drainage with Poly Exchange





Surgeon








Assistant Surgeon(s)


Todd Brunnermer, PA





Estimated Blood Loss


20ML





Findings


Grossly infected total knee





Specimens





Culture set 1. Right Knee  sent for Routine C+S, Gram Stain, Anerobic, Aerobic 


Culture set 2. Right knee Posterior tissue, Sent for  Routine C+S, Gram Stain, 


Anerobic, Aerobic











A. Removed poly right knee





Drains


2 Hemovac





Anesthesia Type


General





Complication(s)


none





Disposition


Recovery Room / PACU





Indications


The patient is a 73-year-old male who underwent a uncomplicated right total 

knee arthroplasty in January.  He had done quite well postoperatively.  

Starting on Wednesday he started having generalized malaise.  He started having 

increasing pain and swelling in the knee.  He was diagnosed with having 

pneumonia.  The right knee then became red hot swollen on Friday and he 

presented to the emergency room.  He had an elevated white blood cell count as 

well as a sed rate elevated to 50 with a red swollen knee.  Medicine service 

started him on vancomycin.  He presents for irrigation and debridement and 

polyethylene exchange of the right total knee.





Description of Procedure


Risks benefits and alternatives of surgery including but not limited to 

infection DVT pain stiffness need for surgery damage to blood vessels damage to 

nerves or risks of anesthesia were discussed with the patient and she wished to 

proceed.  Patient was identified in the laterality was confirmed and marked.  A 

well-padded tourniquet was applied and then the limb was prepped and draped in 

standard manner with ChloraPrep.  The limb was exsanguinated and the tourniquet 

was inflated.  





The previous incision was intact.  





The arthrotomy was intact.  I then reopen his arthrotomy and a large amount of 

purulent material was expressed.  Cultures of this fluid were obtained.





The tissues of the deep joint appeared to be grossly infected. 





I then thoroughly irrigated the wound with Pulsavac with bacitracin and the 

fluid for total 9 L.  I performed a sharp excisional debridement down to the 

layer of periosteum of the bone.  I ensured I excised any biofilm within the 

knee.  The knee was then debrided with the versa jet.  The old polyethylene was 

removed.  And the posterior capsule was debrided.





We then changed our gloves placed fresh instruments and placed new drapes down.

  A new polyethylene was placed.  This is a same 5/6 tibia/9 Poly.  Betadine 

soak was performed. 2 Hemovac drains were placed.  The arthrotomy was then 

closed with interrupted #1 Vicryl suture.





The subcutaneous tissue was closed with interrupted 2-0 Vicryl suture. 





The skin was closed with staples.  





A Prevena wound VAC was placed.





Sterile dressings applied and the tourniquet was released.  All needle and 

sponge counts were correct at the end of the procedure patient was transferred 

to the PACU in stable condition without apparent complication.





The PA-C was necessary for assistance with procedure for assistance in 

positioning, prepping, draping, retraction and closure.


I attest to the content of the Intraoperative Record and any orders documented 

therein.  Any exceptions are noted below.

## 2018-08-04 NOTE — PROGRESS NOTE
Subjective


Date of Service:


Aug 4, 2018.


Subjective


Pt evaluation today including:  conversation w/ patient, physical exam, lab 

review, review of studies, review of inpatient medication list


Saw/examined the patient in room 221


He had a drainage of his R knee, doing well now


pain controlled


Denies any other symptoms


Wife states he was wheezing while sleeping





Review of Systems


Constitutional:  No fever, No chills


Respiratory:  + cough, No sputum, No wheezing, No shortness of breath, No 

dyspnea on exertion, No dyspnea at rest, No hemoptysis


Cardiac:  No chest pain, No edema, No palpitations


Abdomen:  No pain, No nausea, No vomiting, No diarrhea





Medications





Current Inpatient Medications








 Medications


  (Trade)  Dose


 Ordered  Sig/Omar


 Route  Start Time


 Stop Time Status Last Admin


Dose Admin


 


 Acetaminophen


  (Tylenol Tab)  650 mg  Q4H  PRN


 PO  8/3/18 19:45


 9/2/18 19:44 Future Hold  


 


 


 Nitroglycerin


  (Nitrostat Tab)  0.4 mg  UD  PRN


 SL  8/3/18 19:45


 9/2/18 19:44   


 


 


 Ondansetron HCl


  (Zofran Inj)  4 mg  Q6H  PRN


 IV  8/3/18 20:30


 9/2/18 20:29   


 


 


 Morphine Sulfate


  (MoRPHine


 SULFATE INJ)  4 mg  Q4H  PRN


 IV  8/3/18 20:30


 8/17/18 20:29   


 


 


 Amlodipine


 Besylate


  (Norvasc Tab)  5 mg  QAM


 PO  8/4/18 09:00


 9/3/18 08:59  8/4/18 10:32


5 MG


 


 Atenolol


  (Tenormin Tab)  25 mg  QAM


 PO  8/4/18 09:00


 9/3/18 08:59  8/4/18 10:32


25 MG


 


 Lorazepam


  (Ativan Tab)  0.5 mg  TID  PRN


 PO  8/3/18 21:30


 9/2/18 21:29   


 


 


 Multivitamins


  (Multivitamin


 Tab)  1 tab  QAM


 PO  8/4/18 09:00


 9/3/18 08:59  8/4/18 10:31


1 TAB


 


 Pantoprazole


 Sodium


  (Protonix Tab)  40 mg  BID


 PO  8/4/18 22:00


 9/3/18 21:59   


 


 


 Oxycodone/


 Acetaminophen


  (Percocet


 5-325mg Tab)  1 tab  Q6H  PRN


 PO  8/3/18 21:30


 8/17/18 21:29   


 


 


 Vancomycin HCl


  (Consult)  1 ea  UD  PRN


 N/A  8/4/18 01:00


 9/3/18 00:59   


 


 


 Vancomycin HCl


 1750 mg/Sodium


 Chloride  535 ml @ 


 200 mls/hr  Q12H


 IV  8/4/18 14:00


 9/15/18 13:59  8/4/18 14:50


200 MLS/HR


 


 Celecoxib


  (CeleBREX CAP)  200 mg  BID


 PO  8/5/18 21:00


 9/4/18 20:59   


 


 


 Oxycodone HCl


  (Roxicodone


 Immediate Rel Tab)  1 TABLET


 FOR PAIN


 RATING...  Q4H  PRN


 PO  8/4/18 09:30


 8/18/18 09:29   


 


 


 Morphine Sulfate


  (MoRPHine


 SULFATE INJ)  2 mg  Q2H  PRN


 IV  8/4/18 09:30


 8/18/18 09:29   


 


 


 Acetaminophen


  (Tylenol Tab)  1,000 mg  Q8


 PO  8/4/18 10:00


 9/3/18 09:59  8/4/18 16:26


1,000 MG


 


 Magnesium


 Hydroxide


  (Milk Of


 Magnesia Susp)  30 ml  Q6H  PRN


 PO  8/4/18 09:30


 9/3/18 09:29   


 


 


 Diphenhydramine


 HCl


  (Benadryl Cap)  25 mg  Q8H  PRN


 PO  8/4/18 09:30


 9/3/18 09:29   


 


 


 Al Hydrox/Mg


 Hydrox/Simethicone


  (Maalox Max Susp)  15 ml  Q4H  PRN


 PO  8/4/18 09:30


 9/3/18 09:29   


 


 


 Zolpidem Tartrate


  (Ambien Tab)  5 mg  HSZ  PRN


 PO  8/4/18 09:30


 9/3/18 09:29   


 


 


 Ondansetron HCl


  (Zofran Inj)  4 mg  Q6H  PRN


 IV  8/4/18 09:30


 9/3/18 09:29   


 


 


 Ketorolac


 Tromethamine


  (Toradol Inj)  30 mg  Q6H  PRN


 IV.  8/4/18 09:30


 8/5/18 09:29   


 


 


 Aspirin


  (Ecotrin Tab)  81 mg  BID


 PO  8/4/18 21:00


 9/3/18 20:59   


 


 


 Albuterol/


 Ipratropium


  (Duoneb)  3 ml  Q4R  PRN


 INH  8/4/18 16:00


 9/3/18 15:59   


 











Objective


Vital Signs











  Date Time  Temp Pulse Resp B/P (MAP) Pulse Ox O2 Delivery O2 Flow Rate FiO2


 


8/4/18 11:41     95 Nasal Cannula 2.0 


 


8/4/18 11:38 36.5 65 18 112/67 (82) 86 Room Air  


 


8/4/18 10:34 36.8 73 16 144/88 (106) 97 Nasal Cannula 2.0 


 


8/4/18 10:25     96 Nasal Cannula 2.0 


 


8/4/18 10:10 36.1 73 20 136/68 96 Nasal Cannula 4 


 


8/4/18 10:00  78 23 140/74 96 Oxymask 4 


 


8/4/18 09:50  74 15 123/76 96 Oxymask 10 


 


8/4/18 09:40  78 18 148/75 97 Oxymask 10 


 


8/4/18 09:32 36.1 84 16 150/82 97 Oxymask 10 


 


8/4/18 07:19 37.4 76 18 157/82 (107) 96 Room Air  


 


8/4/18 04:00 37.0 75 16 160/83 (108) 97 Room Air  


 


8/3/18 23:59      Room Air  


 


8/3/18 23:43 37.9 81 18 139/79 (99) 95 Room Air  


 


8/3/18 18:50 37.5 80 18 172/78 97 Room Air  











Physical Exam


General Appearance:  no apparent distress


Respiratory/Chest:  no respiratory distress, no accessory muscle use, + wheezing

 (end expiratory wheezing diffusely)


Cardiovascular:  regular rate, rhythm, no edema, no murmur


Extremities:  normal inspection, no pedal edema


Neurologic/Psychiatric:  no motor/sensory deficits, alert, normal mood/affect





Laboratory Results





Last 24 Hours








Test


  8/3/18


21:05 8/3/18


22:45 8/4/18


15:12


 


White Blood Count 10.81 K/uL   10.01 K/uL 


 


Red Blood Count 4.93 M/uL   4.57 M/uL 


 


Hemoglobin 15.0 g/dL   13.8 g/dL 


 


Hematocrit 43.7 %   41.6 % 


 


Mean Corpuscular Volume 88.6 fL   91.0 fL 


 


Mean Corpuscular Hemoglobin 30.4 pg   30.2 pg 


 


Mean Corpuscular Hemoglobin


Concent 34.3 g/dl 


  


  33.2 g/dl 


 


 


Platelet Count  K/uL   116 K/uL 


 


Mean Platelet Volume 10.5 fL   9.3 fL 


 


Neutrophils (%) (Auto) 82.7 %   83.3 % 


 


Lymphocytes (%) (Auto) 3.9 %   5.4 % 


 


Monocytes (%) (Auto) 12.9 %   10.8 % 


 


Eosinophils (%) (Auto) 0.0 %   0.1 % 


 


Basophils (%) (Auto) 0.1 %   0.1 % 


 


Neutrophils # (Auto) 8.95 K/uL   8.34 K/uL 


 


Lymphocytes # (Auto) 0.42 K/uL   0.54 K/uL 


 


Monocytes # (Auto) 1.39 K/uL   1.08 K/uL 


 


Eosinophils # (Auto) 0.00 K/uL   0.01 K/uL 


 


Basophils # (Auto) 0.01 K/uL   0.01 K/uL 


 


RDW Standard Deviation 46.7 fL   48.5 fL 


 


RDW Coefficient of Variation 14.4 %   14.5 % 


 


Immature Granulocyte % (Auto) 0.4 %   0.3 % 


 


Immature Granulocyte # (Auto) 0.04 K/uL   0.03 K/uL 


 


Platelet Estimate DECREASED   


 


Sodium Level 134 mmol/L   136 mmol/L 


 


Potassium Level 3.5 mmol/L   3.7 mmol/L 


 


Chloride Level 101 mmol/L   102 mmol/L 


 


Carbon Dioxide Level 26 mmol/L   28 mmol/L 


 


Anion Gap 8.0 mmol/L   5.0 mmol/L 


 


Blood Urea Nitrogen 19 mg/dl   22 mg/dl 


 


Creatinine 1.06 mg/dl   1.01 mg/dl 


 


Est Creatinine Clear Calc


Drug Dose 83.5 ml/min 


  


  88.3 ml/min 


 


 


Estimated GFR (


American) 80.3 


  


  85.1 


 


 


Estimated GFR (Non-


American 69.3 


  


  73.4 


 


 


BUN/Creatinine Ratio 17.6   21.9 


 


Random Glucose 152 mg/dl   157 mg/dl 


 


Estimated Average Glucose 134 mg/dl   


 


Hemoglobin A1c 6.3 %   


 


Calcium Level 7.9 mg/dl   7.8 mg/dl 


 


Magnesium Level 1.9 mg/dl   


 


Total Bilirubin 1.8 mg/dl   1.4 mg/dl 


 


Direct Bilirubin 0.7 mg/dl   


 


Aspartate Amino Transf


(AST/SGOT) 23 U/L 


  


  28 U/L 


 


 


Alanine Aminotransferase


(ALT/SGPT) 24 U/L 


  


  24 U/L 


 


 


Alkaline Phosphatase 83 U/L   101 U/L 


 


Total Protein 6.4 gm/dl   6.1 gm/dl 


 


Albumin 2.6 gm/dl   2.2 gm/dl 


 


Procalcitonin 34.47 ng/ml   


 


Lyme Disease IgG Antibody NEG   


 


Lyme Disease IgM Antibody NEG   


 


Urine Color  DK YELLOW  


 


Urine Appearance  CLEAR  


 


Urine pH  6.5  


 


Urine Specific Gravity  1.020  


 


Urine Protein  1+  


 


Urine Glucose (UA)  NEG  


 


Urine Ketones  NEG  


 


Urine Occult Blood  1+  


 


Urine Nitrite  NEG  


 


Urine Bilirubin  NEG  


 


Urine Urobilinogen  POS  


 


Urine Leukocyte Esterase  NEG  


 


Urine WBC (Auto)  1-5 /hpf  


 


Urine RBC (Auto)  0-4 /hpf  


 


Urine Hyaline Casts (Auto)  0 /lpf  


 


Urine Epithelial Cells (Auto)  10-20 /lpf  


 


Urine Bacteria (Auto)  NEG  


 


Globulin   3.9 gm/dl 


 


Albumin/Globulin Ratio   0.6 











Assessment and Plan


This is a 73 year old male with a past medical history of CAD s/p CABG/stents, 

HTN, HLD, prediabetes, recent R knee replacement - presents with R knee 

swelling.





R Knee Swelling


in the setting of Recent TKA


- s/p I&D


- appreciate ortho input


- Vancomycin for infection


- cultures pending





Wheezing


- no shortness of breath


- nebs as needed


- encouraged incentive spirometry use


- CXR in AM





CAD s/p CABG


- continue aspirin (twice daily for DVT ppx) and b-blocker


- noted statin intolerance





HTN


- BP stable, continue Coreg





DVT ppx


- ASA 81mg BID





FULL CODE

## 2018-08-04 NOTE — DIAGNOSTIC IMAGING REPORT
TWO VIEWS RIGHT KNEE



CLINICAL HISTORY: Septic right knee arthroplasty.



FINDINGS: AP and crosstable lateral portable views of the right knee are

compared to study dated 1/26/2018. A right knee arthroplasty is in near anatomic

alignment. There has been undersurface remodeling of the patella. No bony

erosion or periostitis is identified. No acute fracture is seen. No

periprosthetic lucency is identified. There are expected postoperative changes

around the knee including skin clips, a surgical drain, soft tissue edema, and

subcutaneous gas. Joint fluid is noted.



IMPRESSION: Expected postoperative changes status post right knee arthroplasty.

No acute osseous abnormality is seen.







Electronically signed by:  Harsh Vasques M.D.

8/4/2018 9:56 AM



Dictated Date/Time:  8/4/2018 9:55 AM

## 2018-08-04 NOTE — ANESTHESIOLOGY PROGRESS NOTE
Anesthesia Post Op Note


Date & Time


Aug 4, 2018 at 10:09





Vital Signs


Pain Intensity:  0





Vital Signs Past 12 Hours








  Date Time  Temp Pulse Resp B/P (MAP) Pulse Ox O2 Delivery O2 Flow Rate FiO2


 


8/4/18 10:00  78 23 140/74 96 Oxymask 4 


 


8/4/18 09:50  74 15 123/76 96 Oxymask 10 


 


8/4/18 09:40  78 18 148/75 97 Oxymask 10 


 


8/4/18 09:32 36.1 84 16 150/82 97 Oxymask 10 


 


8/4/18 07:19 37.4 76 18 157/82 (107) 96 Room Air  


 


8/4/18 04:00 37.0 75 16 160/83 (108) 97 Room Air  


 


8/3/18 23:59      Room Air  


 


8/3/18 23:43 37.9 81 18 139/79 (99) 95 Room Air  











Notes


Mental Status:  alert / awake / arousable, participated in evaluation


Pt Amnestic to Procedure:  Yes


Nausea / Vomiting:  adequately controlled


Pain:  adequately controlled


Airway Patency, RR, SpO2:  stable & adequate


BP & HR:  stable & adequate


Hydration State:  stable & adequate


Anesthetic Complications:  no major complications apparent

## 2018-08-04 NOTE — HISTORY AND PHYSICAL
History & Physical


Documentation Date


Aug 4, 2018.





Chief Complaint


Right knee pain





HPI (Knee Pain)


Additional Notes:


73-year-old white male known to our practice who is status post right and left 

total knee arthroplasties in the past.  Patient states that approximately 4 

days ago he began feeling poorly and was just not eating.  He has been recently 

diagnosed with pneumonia of which he states they were unsure if it was or was 

not but began treating him for it.  He began having increased pain in the right 

knee and yesterday the knee continued to swell and become more red with streaks 

going down the leg towards the foot.  He had difficulty bending the knee 

because of severe pain and was unable to put weight on the knee because of the 

same type of pain.  He was seen at [facility] emergency room where it was felt 

that he had a infection in his right TKA.  [facility] was contacted and 

had the patient transferred to [facility] for further care.  

Currently the patient is lying in bed awake alert and states he is continuing 

to have pain in his right knee although a lot of the redness has left his right 

lower extremity.  He had been started on vancomycin.





Past Medical/Surgical History


Medical Problems:


(1) DJD (degenerative joint disease) of knee


(2) Right knee DJD





Additional History


Hypertension, hypercholesteremia, BPH, CAD.





Past surgical history: CABG in 1996, stent placement in 2015.  Fundoplication 2

, cholecystectomy, herniorrhaphy, left and right total knee replacements as 

noted above.  Right TKA was done in January of this year.





Family History


+ Heart disease





Social History


Smoking Status:  Never Smoker


Smokeless Tobacco Use:  No


Alcohol Use:  none


Marital Status:  





Allergies


Coded Allergies:  


     Enalapril (Verified  Adverse Reaction, Unknown, SEVERE HEADACHE, 1/26/18)


     Lisinopril (Verified  Adverse Reaction, Unknown, COUGH, 1/26/18)


     Statins (Verified  Adverse Reaction, Unknown, SEVERE MUSCLE ACHES, 1/26/18)





Home Medications


Scheduled


Acetaminophen (Sb Non-Aspirin Extra Stre), 1,000 MG PO Q8


Amlodipine (Norvasc), 5 MG PO QAM


Aspirin (Aspirin Chewable), 81 MG PO DAILY


Atenolol (Tenormin), 25 MG PO QAM


Colesevelam Hcl (Welchol), 2 TAB PO TID


Ferrous Gluconate (Ferrous Gluconate), 324 MG PO TIDM


Multivitamin (Multivitamin), 1 TAB PO QAM


Omeprazole (Prilosec), 20 MG PO BID





Scheduled PRN


Lorazepam (Ativan), 0.5 MG PO TID PRN for Anxiety


Ondansetron Hcl (Zofran), 8 MG PO Q8 PRN for Nausea


Potassium Chloride (K-Tabs), 1 TAB PO DAILY PRN for CRAMPS


Zolpidem Tartrate (Ambien), 5 MG PO HS PRN for Sleep





Review of Systems


Patient states that he was having fevers over the last several days up to 104.

  Some chills were noted.  Generally has been feeling poorly with general 

malaise.  He denies any nausea or vomiting.  No increased shortness of breath 

at rest or exertion, no increased cough or sputum production, recent diagnosis 

of questionable pneumonia.  No recent chest pain, chest pressure, irregular 

heartbeat.  Denies abdominal pain, nausea, vomiting, diarrhea, melena, 

hematemesis, hematochezia.  History of BPH and renal calculi in the past.  

Denies any recent hematuria pyuria or dysuria.  His history of CVA, TIA, 

seizure disorder, migraine headaches.





Physical Exam


Addiitonal Comments:


PHYSICAL EXAM





General: Patient is a mildly obese white male who is alert and oriented 3 and 

in no acute distress pleasant and cooperative





Skin: Warm and dry





HEENT: Head is normocephalic atraumatic, there is no scleral icterus or 

injection seen at this time.  EOMI.  Nasal airways patent and oral mucosa is 

pink and moist





Neck: Supple without adenopathy





Heart: Regular rate and rhythm without murmurs





Lungs: Clear to auscultation





Abdomen: Obese, nontender, bowel sounds are present and active 4, nontender to 

palpation





Genitalia and rectal: Not performed





Extremities: On examination of the patient's right lower extremity he has not 

noted large effusion of the right knee.  The knee is overtly warm to the touch 

compared to the left.  He has a well-healed scar from previous surgery.  There 

are no areas of open wounds that are draining at this time.  The knee is tender 

on palpation.  He is unable to fully extend the knee at this time due to pain 

and has very limited range of motion because of pain.  Left lower extremity is 

essentially benign at this time and has a scar from previous TKA on the left 

knee.  Upper extremities are equal bilaterally with range of motion and are 

essentially nontender at the shoulders elbows and wrists.  Sensation is intact 

and he has no gross motor or sensory loss seen at this time other than due to 

inability to move the knee due to pain.  Right lower extremity had some 

increased edema towards the ankle and distal pulses were difficult to 

appreciate.





Laboratory





Last 24 Hours








Test


  8/3/18


21:05 8/3/18


22:45 8/4/18


04:44


 


White Blood Count 10.81 K/uL   


 


Red Blood Count 4.93 M/uL   


 


Hemoglobin 15.0 g/dL   


 


Hematocrit 43.7 %   


 


Mean Corpuscular Volume 88.6 fL   


 


Mean Corpuscular Hemoglobin 30.4 pg   


 


Mean Corpuscular Hemoglobin


Concent 34.3 g/dl 


  


  


 


 


Platelet Count  K/uL   


 


Mean Platelet Volume 10.5 fL   


 


Neutrophils (%) (Auto) 82.7 %   


 


Lymphocytes (%) (Auto) 3.9 %   


 


Monocytes (%) (Auto) 12.9 %   


 


Eosinophils (%) (Auto) 0.0 %   


 


Basophils (%) (Auto) 0.1 %   


 


Neutrophils # (Auto) 8.95 K/uL   


 


Lymphocytes # (Auto) 0.42 K/uL   


 


Monocytes # (Auto) 1.39 K/uL   


 


Eosinophils # (Auto) 0.00 K/uL   


 


Basophils # (Auto) 0.01 K/uL   


 


RDW Standard Deviation 46.7 fL   


 


RDW Coefficient of Variation 14.4 %   


 


Immature Granulocyte % (Auto) 0.4 %   


 


Immature Granulocyte # (Auto) 0.04 K/uL   


 


Platelet Estimate DECREASED   


 


Sodium Level 134 mmol/L   


 


Potassium Level 3.5 mmol/L   


 


Chloride Level 101 mmol/L   


 


Carbon Dioxide Level 26 mmol/L   


 


Anion Gap 8.0 mmol/L   


 


Blood Urea Nitrogen 19 mg/dl   


 


Creatinine 1.06 mg/dl   


 


Est Creatinine Clear Calc


Drug Dose 83.5 ml/min 


  


  


 


 


Estimated GFR (


American) 80.3 


  


  


 


 


Estimated GFR (Non-


American 69.3 


  


  


 


 


BUN/Creatinine Ratio 17.6   


 


Random Glucose 152 mg/dl   


 


Estimated Average Glucose 134 mg/dl   


 


Hemoglobin A1c 6.3 %   


 


Calcium Level 7.9 mg/dl   


 


Magnesium Level 1.9 mg/dl   


 


Total Bilirubin 1.8 mg/dl   


 


Direct Bilirubin 0.7 mg/dl   


 


Aspartate Amino Transf


(AST/SGOT) 23 U/L 


  


  


 


 


Alanine Aminotransferase


(ALT/SGPT) 24 U/L 


  


  


 


 


Alkaline Phosphatase 83 U/L   


 


Total Protein 6.4 gm/dl   


 


Albumin 2.6 gm/dl   


 


Procalcitonin 34.47 ng/ml   


 


Lyme Disease IgG Antibody NEG   


 


Lyme Disease IgM Antibody NEG   


 


Urine Color  DK YELLOW  


 


Urine Appearance  CLEAR  


 


Urine pH  6.5  


 


Urine Specific Gravity  1.020  


 


Urine Protein  1+  


 


Urine Glucose (UA)  NEG  


 


Urine Ketones  NEG  


 


Urine Occult Blood  1+  


 


Urine Nitrite  NEG  


 


Urine Bilirubin  NEG  


 


Urine Urobilinogen  POS  


 


Urine Leukocyte Esterase  NEG  


 


Urine WBC (Auto)  1-5 /hpf  


 


Urine RBC (Auto)  0-4 /hpf  


 


Urine Hyaline Casts (Auto)  0 /lpf  


 


Urine Epithelial Cells (Auto)  10-20 /lpf  


 


Urine Bacteria (Auto)  NEG  











Diagnosis & Plan


Diagnosis & Plan





(1) SEPTIC RIGHT TKA


Plan:  I&D poly exchange

## 2018-08-04 NOTE — INTERNAL MEDICINE CONSULTATION
DATE OF CONSULTATION:  08/03/2018

 

PRIMARY CARE PHYSICIAN:  Dr. Wick.

 

Patient seen on request of Dr. Teague for preop eval and medical management.

 

HISTORY OF PRESENT ILLNESS:  



Medical history significant for CAD status post CABG/stenting,

hypertension, hyperlipidemia, statin intolerance, prediabetes, 

GERD sp surgery.



Recent confinement for elective right TKA for for arthritis last January 2018.  

Uneventful postop course.  



This week, patient playing a lot of golf. 

About 3 days ago, patient woke up with chills, aches, and malaise.  

No chest pain, no shortness of breath.  

Dry cough symptoms he has had for months now for which his PCP has prescribed 

as need inhalers. some urinary frequency noted.

Malaise worsening. 

No recollection of recent tick bites, but patient has some concerns given 
golfing. 



This morning patient woke up with painful right knee swelling, 

low-grade fever as per patient.  



Patient seen at [facility].

LE venous Dopplers of the RLE  negative for DVT. 

Right knee x-ray showed joint effusion with subcutaneous edema.



Patient sent to [facility] after coordination with U service.  



 

MEDICAL HISTORY:  As above.  Last [facility] visit was 01/2018

For preop evaluation for recent joint surgery.

 

OPERATIONS:  He has had CABG, right knee surgery, Nissen fundoplication,

cholecystectomy.

 

HOME MEDICATIONS:  Include Norvasc, omeprazole, tamsulosin, atenolol,

Welchol, Ambien, Lyrica, Flovent, nitroglycerin, aspirin.

 

ALLERGIES:  ALLERGIC TO STATIN, LISINOPRIL, MEVACOR, VASOTEC, ZETIA, ZOCOR.

 

FAMILY HISTORY:  Heart disease.

 

PERSONAL AND SOCIAL HISTORY:  Nonsmoker.  No chronic intake of alcoholic

beverages.  Occasionally works as a .



FUNCTIONALITY : Still able to do housework without issues, active golfer.



REVIEW OF SYSTEMS:  As per HPI, all 10 systems reviewed.  All other ROS

negative.







 

PHYSICAL EXAMINATION:

VITAL SIGNS:  Blood pressure in the 170/80, pulse rate 72, RR 18, temperature 37

O2 sats 97 on room air.

GENERAL:  Noted to be slightly anxious, obese, in no respiratory distress.

SKIN:  Normal color, warm.  Petechiae noted in the lower extremities.

HEENT: Partial alopecia. Pink palpebral conjunctivae.  No ptosis.  Dry mucosa.  

NECK:  Supple, nontender.

CHEST:  Clear to auscultation.  No tenderness.

HEART:  Regular rate and rhythm, no murmur.

ABDOMEN:  Soft, nontender.

EXTREMITIES:  Ice pack noted in the right lower extremity, tender swelling on

the right knee with some limitation in motion.

NEUROLOGIC:  Coherent.  No gross focality.

 

LABORATORY DATA:  Hemoglobin noted to be 15,  white cell

count 10.81, platelets noted to be low  Sodium 136, potassium 3.5,

creatinine 1, glucose 152. 



Chest x-ray, mild parenchymal infiltrate, left base.  



UA is still pending.

 

ASSESSMENT:  



1. Right knee swelling

history of recent R TKR  01/2018

Possible prosthetic joint infection

No overt sepsis for now.  



2. Constitutional symptoms secondary to above

rule out atypical pneumonia, urinary tract infection (contaminated UA from [facility], hx urinary frequency/urgency), Lyme disease

(patient concerns given golfing history) - as alternative sources of infection 



3.  CAD status post CABG stenting

Stable cardiac status following recent outpatient preop eval by his [place] 
cardiologist January 2018 prior to recent surgery.

4.  hypertension slightly elevated

5.  prediabetes as per records

6.  Thrombocytopenia 2 to illness.

 

RECOMMENDATIONS: 

No medical contraindication to contemplated procedure.  

Follow results of blood cultures done at [facility].

Repeat UA.  

Lyme screen 

Low threshold for initiating antibiotic Rx with IV Vancomycin for prostatic 
joint infection if patient develops fever overnight 

Synovial fluid sampling/Management of right knee issues as per Orthopedics. 
Resume home ASA for secondary CAD prevention once platelets stable and bleeding 
risk minimal and negligible pending Orthopedics eval.

Check hemoglobin A1c 

DVT prophylaxis, SCDs, RE thrombocytopenia.  



Thank you very much for this consultation.  

Dr. Harris will follow the patient's progress.

 

 

 

LAURENCE

## 2018-08-04 NOTE — PHARMACY PROGRESS NOTE
Pharmacy Abx Dose Short Note


Date of Service


Aug 4, 2018.





Assessment & Plan


Assessment








* 73 year old male admitted for c/o chills, malaise, muscle aches x 3 days and 

new onset redness R leg.  Patient felt to have septic R TKA


* He is planned for I+D with poly exchange in OR today


* He has a h/o of both R and L TKA's, with the most recent being his R done in 

01/2018


* Baseline SCr ~0.9





Plan








Vancomycin


* Loading dose: 2750mg IV x 1 given this am at 0111


* Maintenance dose: 1750mg (14.6mg/kg) IV Q 12 hours


* Goal trough level for bone/joint infxn : 15 to 20 mcg/mL


* Trough level ordered for: 8/6/18 w/ 4th maint dose


* P'kinetic estimates: Vd 0.7L/kg, half-life ~9-10 hnours





Pharmacy will continue to follow and will adjust dose/frequency as necessary.  

Thank you.

## 2018-08-05 VITALS
TEMPERATURE: 98.42 F | DIASTOLIC BLOOD PRESSURE: 63 MMHG | SYSTOLIC BLOOD PRESSURE: 131 MMHG | HEART RATE: 68 BPM | OXYGEN SATURATION: 95 %

## 2018-08-05 VITALS
TEMPERATURE: 98.96 F | SYSTOLIC BLOOD PRESSURE: 122 MMHG | OXYGEN SATURATION: 96 % | HEART RATE: 75 BPM | DIASTOLIC BLOOD PRESSURE: 72 MMHG

## 2018-08-05 VITALS
HEART RATE: 59 BPM | DIASTOLIC BLOOD PRESSURE: 65 MMHG | SYSTOLIC BLOOD PRESSURE: 112 MMHG | TEMPERATURE: 97.88 F | OXYGEN SATURATION: 97 %

## 2018-08-05 VITALS
HEART RATE: 66 BPM | SYSTOLIC BLOOD PRESSURE: 134 MMHG | TEMPERATURE: 98.96 F | OXYGEN SATURATION: 95 % | DIASTOLIC BLOOD PRESSURE: 69 MMHG

## 2018-08-05 VITALS
SYSTOLIC BLOOD PRESSURE: 104 MMHG | TEMPERATURE: 98.42 F | OXYGEN SATURATION: 93 % | HEART RATE: 64 BPM | DIASTOLIC BLOOD PRESSURE: 56 MMHG

## 2018-08-05 VITALS
HEART RATE: 66 BPM | SYSTOLIC BLOOD PRESSURE: 134 MMHG | TEMPERATURE: 98.96 F | DIASTOLIC BLOOD PRESSURE: 69 MMHG | OXYGEN SATURATION: 95 %

## 2018-08-05 VITALS
DIASTOLIC BLOOD PRESSURE: 62 MMHG | TEMPERATURE: 97.88 F | OXYGEN SATURATION: 95 % | HEART RATE: 69 BPM | SYSTOLIC BLOOD PRESSURE: 129 MMHG

## 2018-08-05 LAB
BUN SERPL-MCNC: 23 MG/DL (ref 7–18)
CALCIUM SERPL-MCNC: 7.4 MG/DL (ref 8.5–10.1)
CO2 SERPL-SCNC: 29 MMOL/L (ref 21–32)
CREAT SERPL-MCNC: 1.04 MG/DL (ref 0.6–1.4)
EOSINOPHIL NFR BLD AUTO: 124 K/UL (ref 130–400)
GLUCOSE SERPL-MCNC: 136 MG/DL (ref 70–99)
HCT VFR BLD CALC: 39.6 % (ref 42–52)
HGB BLD-MCNC: 12.7 G/DL (ref 14–18)
MCH RBC QN AUTO: 29.1 PG (ref 25–34)
MCHC RBC AUTO-ENTMCNC: 32.1 G/DL (ref 32–36)
MCV RBC AUTO: 90.8 FL (ref 80–100)
PMV BLD AUTO: 10.1 FL (ref 7.4–10.4)
POTASSIUM SERPL-SCNC: 3.9 MMOL/L (ref 3.5–5.1)
RED CELL DISTRIBUTION WIDTH CV: 14.4 % (ref 11.5–14.5)
RED CELL DISTRIBUTION WIDTH SD: 48.3 FL (ref 36.4–46.3)
SODIUM SERPL-SCNC: 136 MMOL/L (ref 136–145)
WBC # BLD AUTO: 7.16 K/UL (ref 4.8–10.8)

## 2018-08-05 PROCEDURE — 02HV33Z INSERTION OF INFUSION DEVICE INTO SUPERIOR VENA CAVA, PERCUTANEOUS APPROACH: ICD-10-PCS | Performed by: ORTHOPAEDIC SURGERY

## 2018-08-05 RX ADMIN — ACETAMINOPHEN SCH MG: 500 TABLET, COATED ORAL at 14:20

## 2018-08-05 RX ADMIN — VANCOMYCIN HYDROCHLORIDE SCH MLS/HR: 1 INJECTION, POWDER, LYOPHILIZED, FOR SOLUTION INTRAVENOUS at 02:08

## 2018-08-05 RX ADMIN — Medication SCH MG: at 21:52

## 2018-08-05 RX ADMIN — AMLODIPINE BESYLATE SCH MG: 5 TABLET ORAL at 07:37

## 2018-08-05 RX ADMIN — Medication SCH MG: at 07:36

## 2018-08-05 RX ADMIN — ACETAMINOPHEN SCH MG: 500 TABLET, COATED ORAL at 06:00

## 2018-08-05 RX ADMIN — CELECOXIB SCH MG: 200 CAPSULE ORAL at 21:54

## 2018-08-05 RX ADMIN — VANCOMYCIN HYDROCHLORIDE SCH MLS/HR: 1 INJECTION, POWDER, LYOPHILIZED, FOR SOLUTION INTRAVENOUS at 14:17

## 2018-08-05 RX ADMIN — Medication SCH TAB: at 07:36

## 2018-08-05 RX ADMIN — ACETAMINOPHEN SCH MG: 500 TABLET, COATED ORAL at 21:53

## 2018-08-05 RX ADMIN — PANTOPRAZOLE SCH MG: 40 TABLET, DELAYED RELEASE ORAL at 07:36

## 2018-08-05 RX ADMIN — PANTOPRAZOLE SCH MG: 40 TABLET, DELAYED RELEASE ORAL at 21:53

## 2018-08-05 NOTE — DIAGNOSTIC IMAGING REPORT
CHEST ONE VIEW PORTABLE



CLINICAL HISTORY: r/o infection dyspnea



COMPARISON STUDY:  8/3/2018



FINDINGS: Stable cardiomegaly. Improvement in visibility left hemidiaphragm and

left base. Lungs otherwise appear clear. 



IMPRESSION:  Improving infiltrative change left base. Stable cardiomegaly. 











The above report was generated using voice recognition software.  It may contain

grammatical, syntax or spelling errors.









Electronically signed by:  Lanre Randle M.D.

8/5/2018 7:12 AM



Dictated Date/Time:  8/5/2018 7:12 AM

## 2018-08-05 NOTE — INFECT. DISEASE CONSULTATION
DATE OF CONSULTATION:  08/05/2018

 

HISTORY OF PRESENT ILLNESS:  This is a 73-year-old gentleman who was admitted

to the hospital on the 3rd after he had worsening right knee pain.  He states

that he was initially seen in [place] and was subsequently transferred to

[facility] for admission.  He did begin to have knee pain on Wednesday. 

He golfed 18 holes on Monday and felt well, Wednesday he began with some

pain, diffuse myalgias and subjective fevers at home.  This continued to

worsen and on Friday when he woke up he noticed his knee was red with

streaking and he was unable to bear weight.  He was taken to the operating

room and had a poly exchange.  He tolerated this procedure well.  His OR

cultures are pending, but Gram stain has gram positive cocci.  He is

empirically on vancomycin.  He initially had a leukocytosis of 10.8, this has

improved.  He has been afebrile, although he does admit to some sweats and

chills throughout the night.  He is feeling much better today.  His appetite

has returned.  He denies any fever.  He denies any pain, although he did have

pain medication prior to my examination.  He is tolerating antibiotics well. 

He denies any chest pain, cough, shortness of breath, nausea, vomiting,

diarrhea or abdominal pain.  His remaining review of systems is reviewed and

unremarkable.

 

PAST MEDICAL HISTORY:  Significant for degenerative joint disease.

 

He also has hypertension, hypercholesterolemia, BPH, coronary artery disease.

 

PAST SURGICAL HISTORY:  CABG, stent placement, fundoplication,

cholecystectomy, hernia repair, total knee replacement bilaterally with

revision on the right.

 

FAMILY HISTORY:  Noncontributory.

 

SOCIAL HISTORY:  Negative for tobacco use, alcohol use or drug use.

 

ALLERGIES:  HE HAS ALLERGIES TO ENALAPRIL, LISINOPRIL, AND STATINS.

 

CURRENT MEDICATIONS:  Celebrex, Protonix, aspirin, albuterol, vancomycin,

Tylenol, Roxicodone, morphine, milk of magnesia, Benadryl, Maalox, Ambien,

Zofran, Toradol, Norvasc, atenolol, multivitamins, Ativan, Percocet.

 

PHYSICAL EXAMINATION:

VITAL SIGNS:  He is afebrile, pulse 66, respiratory rate 18, blood pressure

134/69, oxygen saturation is 95% on room air.

GENERAL:  He is awake, alert and oriented x3.  He is in no acute distress.

HEENT:  Mucous membranes are moist.  Extraocular muscles are intact.

HEART:  Regular.

LUNGS:  Clear bilaterally.

ABDOMEN:  Soft, nontender, nondistended.

EXTREMITIES:  Right lower extremity dressing is clean, dry and intact.  There

is no edema bilaterally.

 

LABORATORY STUDIES:  CBC today, white blood cell count 7.1, hemoglobin 12.7,

platelets are 124.  Chemistry panel:  Sodium 136, potassium 3.9, chloride

106, bicarbonate 29, BUN 23, creatinine 1.0.  Glucose 136.  LFTs are within

normal limits.  UA is negative.  Lyme screen done on the 3rd is negative.  OR

cultures are pending, but showing gram positive cocci.  Chest x-ray is

unremarkable.

 

ASSESSMENT AND PLAN:  Infected right total knee.  He is status post a poly

exchange.  He will remain on vancomycin and we will follow additional

cultures and make final recommendations at that time.

 

Thank you for this consultation.

## 2018-08-05 NOTE — PROGRESS NOTE
Internal Med Progress Note


Date of Service:


Aug 5, 2018.


Provider Documentation:





SUBJECTIVE:





The patient was seen and examined in telemetry floor.


He has significant CAD and underwent right hip debridement for infection


Medically stable and denies any symptoms except right knee pain








OBJECTIVE:





Vital Signs-as noted below





Exam:


General-no apparent distress at rest


Eyes-normal


ENT-normal


Neck-supple


Lungs-clear to auscultate bilaterally


Heart-regular, no murmur


Abdomen-benign soft,, nontender and no organomegaly


Extremities-trace edema on the right, right knee is bandaged, painful movement 

of the right lower extremity


Neuro-alert, awake and oriented 3





Lab data as noted below.


ASSESSMENT & PLAN:








This is a 73 year old male with a past medical history of CAD s/p CABG/stents, 

HTN, HLD, prediabetes, recent R knee replacement - presents with R knee 

swelling.





Right Knee Infection S/P Rt  TKA in January ,2018


- s/p I&D;POD#1


- appreciate ortho input


- Vancomycin for infection


- cultures Group G Beta Strep:Sensitivity pending


-Status post PICC line placement


-appreciate ID input





Wheezing


- no shortness of breath


- nebs as needed


- encouraged incentive spirometry use


- CXR in AM-Improving Infiltrative change right base





CAD s/p CABG


- continue aspirin (twice daily for DVT ppx) and b-blocker


- noted statin intolerance


-No acute symptoms





HTN


- BP stable, continue Coreg





DVT ppx


- ASA 81mg BID





FULL CODE








DISPOSITION


Medically stable


Disposition as per Ortho


Vital Signs:











  Date Time  Temp Pulse Resp B/P (MAP) Pulse Ox O2 Delivery O2 Flow Rate FiO2


 


8/5/18 11:18 36.6 59 16 112/65 (81) 97 Room Air  


 


8/5/18 08:00 37.2 66 18 134/69 (90) 95 Room Air 2.0 


 


8/5/18 08:00     95 Room Air 2.0 


 


8/5/18 06:27 37.2 66 18 134/69 (90) 95 Room Air  


 


8/5/18 03:28 36.6 69 18 129/62 (84) 95 Room Air  


 


8/4/18 23:45 36.9 66 18 120/68 (85) 95 Room Air  


 


8/4/18 20:08 37.2 76 18 108/66 (80) 95 Room Air  


 


8/4/18 20:00      Room Air  








Lab Results:





Results Past 24 Hours








Test


  8/4/18


15:12 8/5/18


05:32 Range/Units


 


 


White Blood Count 10.01 7.16 4.8-10.8  K/uL


 


Red Blood Count 4.57 4.36 4.7-6.1  M/uL


 


Hemoglobin 13.8 12.7 14.0-18.0  g/dL


 


Hematocrit 41.6 39.6 42-52  %


 


Mean Corpuscular Volume 91.0 90.8   fL


 


Mean Corpuscular Hemoglobin 30.2 29.1 25-34  pg


 


Mean Corpuscular Hemoglobin


Concent 33.2


  32.1


  32-36  g/dl


 


 


Platelet Count 116 124 130-400  K/uL


 


Mean Platelet Volume 9.3 10.1 7.4-10.4  fL


 


Neutrophils (%) (Auto) 83.3   %


 


Lymphocytes (%) (Auto) 5.4   %


 


Monocytes (%) (Auto) 10.8   %


 


Eosinophils (%) (Auto) 0.1   %


 


Basophils (%) (Auto) 0.1   %


 


Neutrophils # (Auto) 8.34  1.4-6.5  K/uL


 


Lymphocytes # (Auto) 0.54  1.2-3.4  K/uL


 


Monocytes # (Auto) 1.08  0.11-0.59  K/uL


 


Eosinophils # (Auto) 0.01  0-0.5  K/uL


 


Basophils # (Auto) 0.01  0-0.2  K/uL


 


RDW Standard Deviation 48.5 48.3 36.4-46.3  fL


 


RDW Coefficient of Variation 14.5 14.4 11.5-14.5  %


 


Immature Granulocyte % (Auto) 0.3   %


 


Immature Granulocyte # (Auto) 0.03  0.00-0.02  K/uL


 


Sodium Level 136 136 136-145  mmol/L


 


Potassium Level 3.7 3.9 3.5-5.1  mmol/L


 


Chloride Level 102 106   mmol/L


 


Carbon Dioxide Level 28 29 21-32  mmol/L


 


Anion Gap 5.0 1.0 3-11  mmol/L


 


Blood Urea Nitrogen 22 23 7-18  mg/dl


 


Creatinine


  1.01


  1.04


  0.60-1.40


mg/dl


 


Est Creatinine Clear Calc


Drug Dose 88.3


  85.7


   ml/min


 


 


Estimated GFR (


American) 85.1


  82.2


   


 


 


Estimated GFR (Non-


American 73.4


  70.9


   


 


 


BUN/Creatinine Ratio 21.9 22.3 10-20  


 


Random Glucose 157 136 70-99  mg/dl


 


Calcium Level 7.8 7.4 8.5-10.1  mg/dl


 


Total Bilirubin 1.4  0.2-1  mg/dl


 


Aspartate Amino Transf


(AST/SGOT) 28


  


  15-37  U/L


 


 


Alanine Aminotransferase


(ALT/SGPT) 24


  


  12-78  U/L


 


 


Alkaline Phosphatase 101    U/L


 


Total Protein 6.1  6.4-8.2  gm/dl


 


Albumin 2.2  3.4-5.0  gm/dl


 


Globulin 3.9  2.5-4.0  gm/dl


 


Albumin/Globulin Ratio 0.6  0.9-2

## 2018-08-05 NOTE — PROGRESS NOTE
Progress Note


Date of Service


Aug 5, 2018.





Progress Note


ID Consult Dictated #[id number]





A/P:





1. Infected right knee





-Continue abx, gpc on gram stain, await final culture


-will follow, thank you Yes

## 2018-08-05 NOTE — ORTHOPEDIC PROGRESS NOTE
Orthopedic Progress Note


Date of Service


Aug 5, 2018.





Subjective


Post OP Day:  1


Reports: feeling well, Denies: chest pain, SOB, nausea / vomiting, light 

headedness, calf pain


Additional Notes:


Pt was up ambulating in the BR upon visit.  Returned to bed without difficulty.

  States the knee feels "a little rough" but not as bad as it was.  No new 

complaints presently.  Pain fairly well controlled.





Objective


calves soft nontender, N/V intact, dressing C/D/I, A&O x3, toes mobile, hemovac 

drainage (60ml latest shift)











  Date Time  Temp Pulse Resp B/P (MAP) Pulse Ox O2 Delivery O2 Flow Rate FiO2


 


18 11:18 36.6 59 16 112/65 (81) 97 Room Air  


 


18 08:00 37.2 66 18 134/69 (90) 95 Room Air 2.0 


 


18 08:00     95 Room Air 2.0 


 


18 06:27 37.2 66 18 134/69 (90) 95 Room Air  


 


18 03:28 36.6 69 18 129/62 (84) 95 Room Air  


 


18 23:45 36.9 66 18 120/68 (85) 95 Room Air  


 


18 20:08 37.2 76 18 108/66 (80) 95 Room Air  


 


18 20:00      Room Air  


 


18 11:41     95 Nasal Cannula 2.0 


 


18 11:38 36.5 65 18 112/67 (82) 86 Room Air  








Laboratory Results 24 Hours:











Test


  18


15:12 18


05:32


 


White Blood Count 10.01 K/uL  


 


Red Blood Count 4.57 M/uL  


 


Hemoglobin 13.8 g/dL  12.7 g/dL 


 


Hematocrit 41.6 %  39.6 % 


 


Mean Corpuscular Volume 91.0 fL  


 


Mean Corpuscular Hemoglobin 30.2 pg  


 


Mean Corpuscular Hemoglobin


Concent 33.2 g/dl 


  


 


 


Platelet Count 116 K/uL  


 


Mean Platelet Volume 9.3 fL  


 


Neutrophils (%) (Auto) 83.3 %  


 


Lymphocytes (%) (Auto) 5.4 %  


 


Monocytes (%) (Auto) 10.8 %  


 


Eosinophils (%) (Auto) 0.1 %  


 


Basophils (%) (Auto) 0.1 %  


 


Neutrophils # (Auto) 8.34 K/uL  


 


Lymphocytes # (Auto) 0.54 K/uL  


 


Monocytes # (Auto) 1.08 K/uL  


 


Eosinophils # (Auto) 0.01 K/uL  


 


Basophils # (Auto) 0.01 K/uL  








Additional Notes:


--------------------------------------------------------------------------------

----





RUN DATE: 18                [facility]             

    PAGE 1   


RUN TIME: 821                            Specimen Inquiry                    


--------------------------------------------------------------------------------

------------





PATIENT: GILBERTO GARCIA                  ACCT #: [id number] LOC:  [facility]       U #

: [id number]


                                       AGE/SX: 73/M         ROOM: [facility]       REG

: 18


REG DR:  Liam Lal M.D.        :    1945   BED:  1          DIS

:         


                                       STATUS: ADM IN       TLOC:           


--------------------------------------------------------------------------------

------------








SPEC #: [id number]        JALYN:      STATUS:  RES            REQ 

#: [id number]


                            RECD:      SUBM DR: Liam Lal M.D.        


SOURCE: TISSUE              ENTR:      OT DR: Rodriguez Wick M.D.

            


SPDESC: KNEE RIGHT                                           YoseftresaJoe M.D.       


ORDERED:  AER/LEWIS CULTSMR                                                      

   


--------------------------------------------------------------------------------

------------





  Procedure                         Result                         Verified    

       Site


--------------------------------------------------------------------------------

------------





  GRAM STAIN  Final                                                


        RESULT                      RARE GRAM POSITIVE COCCI


                                    FEW WBCs SEEN








  OR AER/LEWIS CULT  Preliminary                                     


     Organism 1                     GROUP G BETA STREP


        QUANITY                     FEW


        SENS                        NO SENSITIVITY TO FOLLOW





        PLEASE SEE CULTURE NUMBER [id number] FOR SENSITIVITIES.





--------------------------------------------------------------------------------

------------





Assessment & Plan


Assessment:


POD 1 s/p Right I&D / Poly change Right Septic TKA


Plan:


PT/OT WBAT with gentle ROM


Antibx as per ID/Med Team


Cx as noted above


Plan for dressing change tomorrow.  Drain may remain an additional day 

dependent on amount of drainage.








Inhouse Planning


Pain Management:  Celebrex, Morphine, Oxy IR


DVT Prophylaxis:  TEDs, SCDs, ASA





Discharge Planning


Discharge Planning:  home with IV medication

## 2018-08-06 VITALS — HEART RATE: 60 BPM | SYSTOLIC BLOOD PRESSURE: 118 MMHG | DIASTOLIC BLOOD PRESSURE: 93 MMHG | TEMPERATURE: 98.42 F

## 2018-08-06 VITALS
DIASTOLIC BLOOD PRESSURE: 76 MMHG | HEART RATE: 58 BPM | SYSTOLIC BLOOD PRESSURE: 144 MMHG | OXYGEN SATURATION: 97 % | TEMPERATURE: 97.88 F

## 2018-08-06 VITALS
DIASTOLIC BLOOD PRESSURE: 72 MMHG | HEART RATE: 62 BPM | OXYGEN SATURATION: 96 % | SYSTOLIC BLOOD PRESSURE: 133 MMHG | TEMPERATURE: 98.06 F

## 2018-08-06 VITALS
HEART RATE: 66 BPM | DIASTOLIC BLOOD PRESSURE: 67 MMHG | SYSTOLIC BLOOD PRESSURE: 133 MMHG | OXYGEN SATURATION: 94 % | TEMPERATURE: 98.96 F

## 2018-08-06 VITALS
SYSTOLIC BLOOD PRESSURE: 128 MMHG | DIASTOLIC BLOOD PRESSURE: 64 MMHG | HEART RATE: 64 BPM | TEMPERATURE: 98.96 F | OXYGEN SATURATION: 96 %

## 2018-08-06 VITALS
DIASTOLIC BLOOD PRESSURE: 53 MMHG | SYSTOLIC BLOOD PRESSURE: 92 MMHG | HEART RATE: 63 BPM | OXYGEN SATURATION: 96 % | TEMPERATURE: 99.14 F

## 2018-08-06 VITALS — OXYGEN SATURATION: 96 %

## 2018-08-06 LAB
BUN SERPL-MCNC: 18 MG/DL (ref 7–18)
CALCIUM SERPL-MCNC: 7.3 MG/DL (ref 8.5–10.1)
CO2 SERPL-SCNC: 27 MMOL/L (ref 21–32)
CREAT SERPL-MCNC: 0.85 MG/DL (ref 0.6–1.4)
EOSINOPHIL NFR BLD AUTO: 131 K/UL (ref 130–400)
GLUCOSE SERPL-MCNC: 145 MG/DL (ref 70–99)
HCT VFR BLD CALC: 35.7 % (ref 42–52)
HGB BLD-MCNC: 11.5 G/DL (ref 14–18)
MCH RBC QN AUTO: 29.1 PG (ref 25–34)
MCHC RBC AUTO-ENTMCNC: 32.2 G/DL (ref 32–36)
MCV RBC AUTO: 90.4 FL (ref 80–100)
PMV BLD AUTO: 9.7 FL (ref 7.4–10.4)
POTASSIUM SERPL-SCNC: 3.5 MMOL/L (ref 3.5–5.1)
RED CELL DISTRIBUTION WIDTH CV: 14.3 % (ref 11.5–14.5)
RED CELL DISTRIBUTION WIDTH SD: 47.6 FL (ref 36.4–46.3)
SODIUM SERPL-SCNC: 138 MMOL/L (ref 136–145)
WBC # BLD AUTO: 6.76 K/UL (ref 4.8–10.8)

## 2018-08-06 RX ADMIN — CEFTRIAXONE SCH MLS/HR: 2 INJECTION, POWDER, FOR SOLUTION INTRAMUSCULAR; INTRAVENOUS at 11:05

## 2018-08-06 RX ADMIN — PANTOPRAZOLE SCH MG: 40 TABLET, DELAYED RELEASE ORAL at 07:57

## 2018-08-06 RX ADMIN — ACETAMINOPHEN SCH MG: 500 TABLET, COATED ORAL at 15:08

## 2018-08-06 RX ADMIN — ACETAMINOPHEN SCH MG: 500 TABLET, COATED ORAL at 21:28

## 2018-08-06 RX ADMIN — ACETAMINOPHEN SCH MG: 500 TABLET, COATED ORAL at 05:54

## 2018-08-06 RX ADMIN — AMLODIPINE BESYLATE SCH MG: 5 TABLET ORAL at 07:57

## 2018-08-06 RX ADMIN — CELECOXIB SCH MG: 200 CAPSULE ORAL at 07:57

## 2018-08-06 RX ADMIN — ACETAMINOPHEN SCH MG: 500 TABLET, COATED ORAL at 14:46

## 2018-08-06 RX ADMIN — VANCOMYCIN HYDROCHLORIDE SCH MLS/HR: 1 INJECTION, POWDER, LYOPHILIZED, FOR SOLUTION INTRAVENOUS at 02:35

## 2018-08-06 RX ADMIN — CELECOXIB SCH MG: 200 CAPSULE ORAL at 21:29

## 2018-08-06 RX ADMIN — Medication SCH TAB: at 07:57

## 2018-08-06 RX ADMIN — ALUMINUM ZIRCONIUM TRICHLOROHYDREX GLY SCH EA: 0.2 STICK TOPICAL at 23:17

## 2018-08-06 RX ADMIN — PANTOPRAZOLE SCH MG: 40 TABLET, DELAYED RELEASE ORAL at 21:29

## 2018-08-06 RX ADMIN — ALUMINUM ZIRCONIUM TRICHLOROHYDREX GLY SCH EA: 0.2 STICK TOPICAL at 15:21

## 2018-08-06 RX ADMIN — Medication SCH MG: at 21:29

## 2018-08-06 RX ADMIN — Medication SCH MG: at 07:58

## 2018-08-06 NOTE — ANESTHESIOLOGY PROGRESS NOTE
Anesthesia Post Op Note


Date & Time


Aug 6, 2018 at 08:05





Vital Signs





Vital Signs Past 12 Hours








  Date Time  Temp Pulse Resp B/P (MAP) Pulse Ox O2 Delivery O2 Flow Rate FiO2


 


8/6/18 07:00 36.6 58 18 144/76 (98) 97 Room Air  


 


8/6/18 02:52 36.7 62 18 133/72 (92) 96 Room Air  


 


8/5/18 23:45 36.9 68 18 131/63 (85) 95 Room Air  











Notes


Mental Status:  alert / awake / arousable, participated in evaluation


Pt Amnestic to Procedure:  Yes


Nausea / Vomiting:  adequately controlled


Pain:  adequately controlled


Airway Patency, RR, SpO2:  stable & adequate


BP & HR:  stable & adequate


Hydration State:  stable & adequate


Anesthetic Complications:  no major complications apparent

## 2018-08-06 NOTE — PROGRESS NOTE
Subjective


Date of Service:


Aug 6, 2018.


Subjective


Pt evaluation today including:  conversation w/ patient, physical exam, chart 

review, lab review


pt oob to chair, feeling better, pain controlled. denies f/c. vac remains. wbc 

nml. OR cultures with pan sensitive Group G strep. tolerating abx. no cp, sob, 

abd pain, no n/v/d. remaining ros reviewed and are negative.





Objective


Vital Signs











  Date Time  Temp Pulse Resp B/P (MAP) Pulse Ox O2 Delivery O2 Flow Rate FiO2


 


8/6/18 07:00 36.6 58 18 144/76 (98) 97 Room Air  


 


8/6/18 02:52 36.7 62 18 133/72 (92) 96 Room Air  


 


8/5/18 23:45 36.9 68 18 131/63 (85) 95 Room Air  


 


8/5/18 20:00      Room Air  


 


8/5/18 20:00 36.9 64 18 104/56 (72) 93 Room Air  


 


8/5/18 15:36 37.2 75 18 122/72 (89) 96 Room Air  


 


8/5/18 11:18 36.6 59 16 112/65 (81) 97 Room Air  











Physical Exam


General Appearance:  WD/WN, no apparent distress


Eyes:  normal inspection, EOMI


Neck:  supple


Respiratory/Chest:  lungs clear, normal breath sounds, no respiratory distress


Cardiovascular:  regular rate, rhythm, no edema, no murmur


Abdomen:  non tender, soft


Extremities:  non-tender, no pedal edema


Neurologic/Psychiatric:  alert, oriented x 3


Skin:  normal color


Comments:


r knee vac in place. no erythema, warmth





Laboratory Results











Item Value  Date Time


 


Gram Stain - Final Resulted 8/4/18 0818





Incision Site Knee Right  


 


Gram Stain - Final Resulted 8/4/18 0827





Tissue Knee Right  











Last 24 Hours








Test


  8/6/18


01:42 8/6/18


04:17


 


Vancomycin Level Trough 17.1 mcg/ml  


 


White Blood Count  6.76 K/uL 


 


Red Blood Count  3.95 M/uL 


 


Hemoglobin  11.5 g/dL 


 


Hematocrit  35.7 % 


 


Mean Corpuscular Volume  90.4 fL 


 


Mean Corpuscular Hemoglobin  29.1 pg 


 


Mean Corpuscular Hemoglobin


Concent 


  32.2 g/dl 


 


 


RDW Standard Deviation  47.6 fL 


 


RDW Coefficient of Variation  14.3 % 


 


Platelet Count  131 K/uL 


 


Mean Platelet Volume  9.7 fL 


 


Sodium Level  138 mmol/L 


 


Potassium Level  3.5 mmol/L 


 


Chloride Level  107 mmol/L 


 


Carbon Dioxide Level  27 mmol/L 


 


Anion Gap  4.0 mmol/L 


 


Blood Urea Nitrogen  18 mg/dl 


 


Creatinine  0.85 mg/dl 


 


Est Creatinine Clear Calc


Drug Dose 


  104.5 ml/min 


 


 


Estimated GFR (


American) 


  100.2 


 


 


Estimated GFR (Non-


American 


  86.4 


 


 


BUN/Creatinine Ratio  21.7 


 


Random Glucose  145 mg/dl 


 


Calcium Level  7.3 mg/dl 


 


Magnesium Level  2.1 mg/dl 











Assessment and Plan





(1) SEPTIC RIGHT TKA


Assessment & Plan:  will change to rocephin daily, will need 6 weeks. will need 

picc line. will need weekly cbc, cmp, esr while on abx. can follow with ID post 

d/c.

## 2018-08-06 NOTE — PROGRESS NOTE
Internal Med Progress Note


Date of Service:


Aug 6, 2018.


Provider Documentation:





SUBJECTIVE:





The patient was seen and examined in telemetry floor.


He has significant CAD and underwent right hip debridement for infection


Medically stable and denies any symptoms except right knee pain


8/6:


Denies any symptoms today except pain in the right knee and right leg


Right leg pain and swelling are improving


Out of bed in a chair


Getting physical therapy











OBJECTIVE:





Vital Signs-as noted below





Exam:


General-no apparent distress at rest


Eyes-normal


ENT-normal


Neck-supple


Lungs-clear to auscultate bilaterally


Heart-regular, no murmur


Abdomen-benign soft,, nontender and no organomegaly


Extremities-trace edema on the right, right knee is bandaged, painful movement 

of the right lower extremity


Right knee and leg swelling are improved


Neuro-alert, awake and oriented 3





Lab data as noted below.


ASSESSMENT & PLAN:








This is a 73 year old male with a past medical history of CAD s/p CABG/stents, 

HTN, HLD, prediabetes, recent R knee replacement - presents with R knee 

swelling.





Right Knee Infection S/P Rt  TKA in January ,2018


- s/p I&D;POD#2


- appreciate ortho input


-i/v  Vancomycin for infection


- cultures Group G Beta Strep: Pansensitive


-Status post PICC line placement


-appreciate ID input-plan to continue ceftriaxone for 6 weeks in total


-will need weekly cbc, cmp, esr while on abx


-Medically stable





Wheezing


- no shortness of breath


- nebs as needed


- encouraged incentive spirometry use


- CXR in AM-Improving Infiltrative change right base


-No more wheezing and/or shortness of breath





CAD s/p CABG


- continue aspirin (twice daily for DVT ppx) and b-blocker


- noted statin intolerance


-No acute symptoms


-Electrolytes are normal





HTN


- BP stable, continue Coreg





DVT ppx


- ASA 81mg BID





FULL CODE








DISPOSITION


Medically stable


Disposition as per Ortho


Vital Signs:











  Date Time  Temp Pulse Resp B/P (MAP) Pulse Ox O2 Delivery O2 Flow Rate FiO2


 


8/6/18 11:15 36.9 60 18 118/93 (101)   95.0 


 


8/6/18 08:00      Room Air  


 


8/6/18 07:00 36.6 58 18 144/76 (98) 97 Room Air  


 


8/6/18 02:52 36.7 62 18 133/72 (92) 96 Room Air  


 


8/5/18 23:45 36.9 68 18 131/63 (85) 95 Room Air  


 


8/5/18 20:00      Room Air  


 


8/5/18 20:00 36.9 64 18 104/56 (72) 93 Room Air  


 


8/5/18 15:36 37.2 75 18 122/72 (89) 96 Room Air  








Lab Results:





Results Past 24 Hours








Test


  8/6/18


01:42 8/6/18


04:17 Range/Units


 


 


Vancomycin Level Trough


  17.1


  


  SEE COMMENT


mcg/ml


 


White Blood Count  6.76 4.8-10.8  K/uL


 


Red Blood Count  3.95 4.7-6.1  M/uL


 


Hemoglobin  11.5 14.0-18.0  g/dL


 


Hematocrit  35.7 42-52  %


 


Mean Corpuscular Volume  90.4   fL


 


Mean Corpuscular Hemoglobin  29.1 25-34  pg


 


Mean Corpuscular Hemoglobin


Concent 


  32.2


  32-36  g/dl


 


 


RDW Standard Deviation  47.6 36.4-46.3  fL


 


RDW Coefficient of Variation  14.3 11.5-14.5  %


 


Platelet Count  131 130-400  K/uL


 


Mean Platelet Volume  9.7 7.4-10.4  fL


 


Sodium Level  138 136-145  mmol/L


 


Potassium Level  3.5 3.5-5.1  mmol/L


 


Chloride Level  107   mmol/L


 


Carbon Dioxide Level  27 21-32  mmol/L


 


Anion Gap  4.0 3-11  mmol/L


 


Blood Urea Nitrogen  18 7-18  mg/dl


 


Creatinine


  


  0.85


  0.60-1.40


mg/dl


 


Est Creatinine Clear Calc


Drug Dose 


  104.5


   ml/min


 


 


Estimated GFR (


American) 


  100.2


   


 


 


Estimated GFR (Non-


American 


  86.4


   


 


 


BUN/Creatinine Ratio  21.7 10-20  


 


Random Glucose  145 70-99  mg/dl


 


Calcium Level  7.3 8.5-10.1  mg/dl


 


Magnesium Level  2.1 1.8-2.4  mg/dl

## 2018-08-06 NOTE — ORTHOPEDIC PROGRESS NOTE
Orthopedic Progress Note


Date of Service


Aug 6, 2018.





Subjective


Post OP Day:  2


Reports: feeling well, pain controlled w PO medications, Denies: complaints, 

chest pain, SOB, nausea / vomiting, calf pain





Objective


calves soft nontender, N/V intact, dressing C/D/I, A&O x3, toes mobile, hemovac 

drainage


Sitting in bedside chair this morning. Ambulated to bed without difficulty 

using walker. Prevena intact. Mild swelling, no erythema. Hemovac drainage 40mL 

last shift.











  Date Time  Temp Pulse Resp B/P (MAP) Pulse Ox O2 Delivery O2 Flow Rate FiO2


 


8/6/18 07:00 36.6 58 18 144/76 (98) 97 Room Air  


 


8/6/18 02:52 36.7 62 18 133/72 (92) 96 Room Air  


 


8/5/18 23:45 36.9 68 18 131/63 (85) 95 Room Air  


 


8/5/18 20:00      Room Air  


 


8/5/18 20:00 36.9 64 18 104/56 (72) 93 Room Air  


 


8/5/18 15:36 37.2 75 18 122/72 (89) 96 Room Air  


 


8/5/18 11:18 36.6 59 16 112/65 (81) 97 Room Air  


 


8/5/18 08:00 37.2 66 18 134/69 (90) 95 Room Air 2.0 


 


8/5/18 08:00     95 Room Air 2.0 








Laboratory Results 24 Hours:











Test


  8/6/18


04:17


 


Hematocrit 35.7 % 


 


Hemoglobin 11.5 g/dL 











Assessment & Plan


Assessment:


POD 2 s/p Right I&D / Poly change Right Septic TKA


Acute Blood Loss anemia


Plan:


-PT/OT WBAT with gentle ROM


-Antibx as per ID/Med Team


-Cx Group G Strep, is pansensitive


-Dressing removed today, Prevena intact.  Drain may remain an additional day 

dependent on amount of drainage. 





Acute Blood loss anemia-Hgb to 11.5 from 15 on admission. Likely due to 

surgical loss vs dilutional effect. Will follow.








Inhouse Planning


Pain Management:  Celebrex, Morphine, Oxy IR


DVT Prophylaxis:  TEDs, SCDs, ASA





Discharge Planning


Discharge Planning:  home with IV medication

## 2018-08-07 VITALS
HEART RATE: 62 BPM | DIASTOLIC BLOOD PRESSURE: 72 MMHG | TEMPERATURE: 97.52 F | SYSTOLIC BLOOD PRESSURE: 133 MMHG | OXYGEN SATURATION: 94 %

## 2018-08-07 VITALS
OXYGEN SATURATION: 96 % | HEART RATE: 59 BPM | TEMPERATURE: 98.96 F | DIASTOLIC BLOOD PRESSURE: 82 MMHG | SYSTOLIC BLOOD PRESSURE: 167 MMHG

## 2018-08-07 VITALS
OXYGEN SATURATION: 96 % | HEART RATE: 67 BPM | SYSTOLIC BLOOD PRESSURE: 150 MMHG | DIASTOLIC BLOOD PRESSURE: 83 MMHG | TEMPERATURE: 98.42 F

## 2018-08-07 VITALS
TEMPERATURE: 98.96 F | OXYGEN SATURATION: 97 % | SYSTOLIC BLOOD PRESSURE: 167 MMHG | HEART RATE: 60 BPM | DIASTOLIC BLOOD PRESSURE: 87 MMHG

## 2018-08-07 RX ADMIN — Medication SCH TAB: at 07:50

## 2018-08-07 RX ADMIN — CEFTRIAXONE SCH MLS/HR: 2 INJECTION, POWDER, FOR SOLUTION INTRAMUSCULAR; INTRAVENOUS at 10:34

## 2018-08-07 RX ADMIN — Medication SCH MG: at 07:49

## 2018-08-07 RX ADMIN — AMLODIPINE BESYLATE SCH MG: 5 TABLET ORAL at 07:50

## 2018-08-07 RX ADMIN — ACETAMINOPHEN SCH MG: 500 TABLET, COATED ORAL at 05:46

## 2018-08-07 RX ADMIN — CELECOXIB SCH MG: 200 CAPSULE ORAL at 07:50

## 2018-08-07 RX ADMIN — ALUMINUM ZIRCONIUM TRICHLOROHYDREX GLY SCH EA: 0.2 STICK TOPICAL at 07:50

## 2018-08-07 RX ADMIN — ALUMINUM ZIRCONIUM TRICHLOROHYDREX GLY SCH EA: 0.2 STICK TOPICAL at 16:00

## 2018-08-07 RX ADMIN — PANTOPRAZOLE SCH MG: 40 TABLET, DELAYED RELEASE ORAL at 07:50

## 2018-08-07 RX ADMIN — ACETAMINOPHEN SCH MG: 500 TABLET, COATED ORAL at 15:31

## 2018-08-07 NOTE — ORTHOPEDIC PROGRESS NOTE
Orthopedic Progress Note


Date of Service


Aug 7, 2018.





Subjective


Post OP Day:  3


Reports: feeling well, pain controlled w PO medications, Denies: complaints, 

chest pain, SOB, light headedness, calf pain





Objective


calves soft nontender, N/V intact, dressing C/D/I, A&O x3, toes mobile, hemovac 

drainage


Hemovac draining, Prevena intact. 


-Patient waiting to talk with wife about home health vs [facility] for his IV 

medication.











  Date Time  Temp Pulse Resp B/P (MAP) Pulse Ox O2 Delivery O2 Flow Rate FiO2


 


8/7/18 04:34 36.4 62 16 133/72 (92) 94 Room Air  


 


8/7/18 00:00      Room Air  


 


8/6/18 23:29 37.2 66 18 133/67 (89) 94 Room Air  


 


8/6/18 20:09     96 Room Air  


 


8/6/18 19:50 37.2 64 18 128/64 (85) 96 Room Air  


 


8/6/18 15:20 37.3 63 16 92/53 (66) 96 Room Air  


 


8/6/18 11:15 36.9 60 18 118/93 (101)   95.0 


 


8/6/18 08:00      Room Air  











Assessment & Plan


Assessment:


POD 3 s/p Right I&D / Poly change Right Septic TKA


Acute Blood Loss anemia


Plan:


-PT/OT WBAT with gentle ROM


-Antibx as per ID/Med Team-Will need Ceftriaxone daily x 6 weeks, as well as 

weekly labs.


-Cx Group G Strep, is pansensitive


-Prevena intact.  


-D/C planning likely home health vs [facility] for his IV medications. Patient needs 

to discuss with his wife. 





Acute Blood loss anemia-Hgb to 11.5 from 15 on admission. Likely due to 

surgical loss vs dilutional effect. Will follow.








Inhouse Planning


Pain Management:  Celebrex, PO Tylenol, Oxy IR


DVT Prophylaxis:  TEDs, SCDs, ASA





Discharge Planning


Discharge Planning:  home with IV medication

## 2018-08-07 NOTE — PROGRESS NOTE
Internal Med Progress Note


Date of Service:


Aug 7, 2018.


Provider Documentation:





SUBJECTIVE:





The patient was seen and examined in telemetry floor.


He has significant CAD and underwent right hip debridement for infection


Medically stable and denies any symptoms except right knee pain


8/6:


Denies any symptoms today except pain in the right knee and right leg


Right leg pain and swelling are improving


Out of bed in a chair


Getting physical therapy





8/7: Medically stable and denies any symptoms except pain in right knee and leg


Right knee and leg swelling have been getting better


We will go home as per primary











OBJECTIVE:





Vital Signs-as noted below





Exam:


General-no apparent distress at rest


Eyes-normal


ENT-normal


Neck-supple


Lungs-clear to auscultate bilaterally


Heart-regular, no murmur


Abdomen-benign soft,, nontender and no organomegaly


Extremities-trace edema on the right, right knee is bandaged, painful movement 

of the right lower extremity


Right knee and leg swelling are improved


Neuro-alert, awake and oriented 3





Lab data as noted below.


ASSESSMENT & PLAN:








This is a 73 year old male with a past medical history of CAD s/p CABG/stents, 

HTN, HLD, prediabetes, recent R knee replacement - presents with R knee 

swelling.





Right Knee Infection S/P Rt  TKA in January ,2018


- s/p I&D;POD#3


- appreciate ortho input


-i/v  Vancomycin for infection


- cultures Group G Beta Strep: Pansensitive


-Status post PICC line placement


-appreciate ID input-plan to continue ceftriaxone for 6 weeks in total


-will need weekly cbc, cmp, esr while on abx


-Right knee swelling has been improving with less pain


-Medically stable to be discharged


-We will have antibiotic IV for a total of 6 weeks





Wheezing-resolved


- no shortness of breath


- nebs as needed


- encouraged incentive spirometry use


- CXR in AM-Improving Infiltrative change right base


-No more wheezing and/or shortness of breath





CAD s/p CABG


- continue aspirin (twice daily for DVT ppx) and b-blocker


- noted statin intolerance


-No acute symptoms


-Electrolytes are normal





HTN


- BP stable, continue Coreg





DVT ppx


- ASA 81mg BID





FULL CODE








DISPOSITION


Medically stable


Disposition as per Ortho


Vital Signs:











  Date Time  Temp Pulse Resp B/P (MAP) Pulse Ox O2 Delivery O2 Flow Rate FiO2


 


8/7/18 14:57 37.2 59 20 167/82 (110) 96 Room Air  


 


8/7/18 13:42 37.2 60 20  97 Room Air  


 


8/7/18 11:21 37.2 60 20 167/87 (113) 97 Room Air  





      Free Flow/Blowby  


 


8/7/18 08:00      Room Air  


 


8/7/18 07:36 36.9 67 20 150/83 (105) 96 Room Air  


 


8/7/18 04:34 36.4 62 16 133/72 (92) 94 Room Air  


 


8/7/18 00:00      Room Air  


 


8/6/18 23:29 37.2 66 18 133/67 (89) 94 Room Air  


 


8/6/18 20:09     96 Room Air  


 


8/6/18 19:50 37.2 64 18 128/64 (85) 96 Room Air  


 


8/6/18 15:20 37.3 63 16 92/53 (66) 96 Room Air

## 2021-04-16 NOTE — DISCHARGE INSTRUCTIONS
Discharge Instructions


Date of Service


Aug 7, 2018.





Admission


Reason for Admission:  Septic, Right Total Knee





Discharge


Discharge Diagnosis / Problem:  Septic Right Total Knee Arthroplasty





Discharge Goals


Goal(s):  Decrease discomfort, Improve function





Activity Recommendations


Activity Limitations:  per Instructions/Follow-up section





.





Instructions / Follow-Up


Instructions / Follow-Up


ACTIVITY RECOMMENDATIONS:





SELF CARE INSTRUCTIONS AFTER TOTAL KNEE REPLACEMENT





A.  You may need to continue a physical therapy program after discharge from 

the hospital.  There are several options available to you. 


      Your doctor will assist you in selecting the best one for you.





   1.  An out-patient facility 2 to 3 times a week for therapy or home therapy.


   2.  Continue working on all exercises taught to you in the hospital.  Your


                 goals should be to increase bending of your knee to 90 degrees 

and


                 beyond and to fully straighten your knee.





B.  You may progress at your own pace from walking with a walker or crutches to 

a cane; then to no assistive devices.





C.   Make walking a part of your daily routine.  Be up as much as comfortable 

with rest periods throughout the day.  


      Rest with leg elevation is very important. 


      Use the ice wrap frequently for the first 3-4 weeks.





D.  There are no restrictions on activities.  You may ride in a car, shop, 

participate in household chores and all social activities.





E.  Wear the long elastic stockings (SUBHA hose) 20 hours a day for 2 weeks after 

surgery.  


     They can be removed several times a day for laundering and for a bath.





F.  You may shower, no tub baths until cleared by your doctor.








SPECIAL CARE INSTRUCTIONS:





**VERY IMPORTANT TO READ AND REVIEW**





A.  There are a few signs you need to watch for after you are home.  Call  

[facility] if you notice any of the followin.  Increased severe knee pain.  Some pain is expected especially  when you 

exercise.


   2.  Increased swelling in your leg or knee; pain or swelling of the calf 

muscle in either lower leg.


   3.  Any fluid drainage from the incision.


   4.  Shortness of breath or chest pain.





B.  Please call [facility] at [phone] if you have any  

concerns or questions about your operation or recovery.  


     The doctor or his nurse will return your call promptly.





C.  You must take antibiotics before dental work, bladder, bowel or other 

surgery.  


      Your doctor will provide you with a permanent care to carry describing 

this precaution.





IMPORTANT:





*  REMEMBER TO TAKE ASPIRIN, 81 MG, TWICE DAILY FOR 4 WEEKS UNLESS OTHERWISE 

DIRECTED.  


   THIS IS YOUR BLOOD THINNER.





*  HIGH RISK PATIENTS MAY BE PRESCRIBED A STRONGER BLOOD THINNER.  


   THIS WILL BE PROVIDED AT DISCHARGE.





*  CALL IF INCREASED PAIN, REDNESS, DRAINAGE OR FEVER GREATER THAT 101.





*  WEAR SUBHA HOSE 20 HOURS PER DAY FOR 2 WEEKS.





* YOU HAVE A PREVENA WOUND VAC DRESSING. THIS SHOULD REMAIN IN PLACE FOR 7 DAYS 

POST SURGERY.





Prevena-  This is a large suction dressing  covering your incision.  This will 

help pull any excess drainage from the wound and allow your incision to heal 

properly.  You may shower with this if you can keep the unit outside of the 

shower.  If any bleeding or leakage is noted please call your doctor's office.  

This will remain on your incision for 7 days and then should be removed.  This 

can be done yourself or by the home nursing staff if applicable.  The entire 

unit is disposable once removed.  Once removed, keep incision clean and dry.  

If redness or drainage is noted, please call your surgeon.








FOLLOW UP VISIT:





If appointment is not already scheduled:





Please call [facility] to make a follow-up appointment for 


2 weeks after your surgery at [phone].





Current Hospital Diet


Patient's current hospital diet: AHA Diet (Heart Healthy)





Discharge Diet


Recommended Diet:  Regular Diet





Procedures


Procedures Performed:  


Right Knee Incision and Drainage with Poly Exchange





Pending Studies


Studies pending at discharge:  no





Laboratory Results





Hemoglobin A1c








Test


  8/3/18


21:05 Range/Units


 


 


Estimated Average Glucose 134   mg/dl


 


Hemoglobin A1c 6.3 H 4.5-5.6  %











Medical Emergencies








.


Who to Call and When:





Medical Emergencies:  If at any time you feel your situation is an emergency, 

please call 911 immediately.





.





Non-Emergent Contact


Non-Emergency issues call your:  Pulmonologist


Call Non-Emergent contact if:  temperature is above 101, your pain is not 

controlled, your pain is worsening, your pain is concerning you, wound has 

increased drainage, wound has increased redness, wound has increased pain


.








"Provider Documentation" section prepared by Hay Kang.








.





Consultant Recommendations


Consultant Recommendations:


-Per Dr. Rena Wang of Infectious disease you will be required to be on 

IV antibiotics daily for 6 weeks. You will also need weekly lab draws.


-Follow up with Dr. Wang's office upon discharge





[place] Drug Monitoring Program


Search Results:  patient reviewed within database, no issues identified Pt nontoxic appearing, stable vitals, ambulatory with stable saturation without supplemental oxygen. PT does not meet criteria listed in most updated guidelines as per [facility] protocol/algorithm for admission at this time. pt advised about self-quarantine instructions until negative test results and/or symptom resolution. pt advised on hand hygiene, monitoring of symptoms, antipyretic use as well as and fu with primary care provider. Instructions given in pre-printed copy.

## 2021-07-09 NOTE — HISTORY & PHYSICAL BRIDGE NOTE
H&P Re-Evaluation


Bridge Note:


I have examined the patient, reviewed the History & Physical and in the 

interval since the performance of the History & Physical I have noted the 

following changes of clinical significance: No changes noted 50